# Patient Record
Sex: FEMALE | Race: WHITE | NOT HISPANIC OR LATINO | Employment: OTHER | URBAN - METROPOLITAN AREA
[De-identification: names, ages, dates, MRNs, and addresses within clinical notes are randomized per-mention and may not be internally consistent; named-entity substitution may affect disease eponyms.]

---

## 2017-02-02 ENCOUNTER — HOSPITAL ENCOUNTER (OUTPATIENT)
Facility: CLINIC | Age: 52
Setting detail: SPECIMEN
Discharge: HOME | End: 2017-02-02
Attending: INTERNAL MEDICINE | Admitting: INTERNAL MEDICINE
Payer: COMMERCIAL

## 2017-02-02 ENCOUNTER — ONCOLOGY VISIT (OUTPATIENT)
Dept: ONCOLOGY | Facility: CLINIC | Age: 52
End: 2017-02-02
Attending: INTERNAL MEDICINE
Payer: COMMERCIAL

## 2017-02-02 VITALS
OXYGEN SATURATION: 100 % | DIASTOLIC BLOOD PRESSURE: 75 MMHG | WEIGHT: 237.2 LBS | HEIGHT: 65 IN | TEMPERATURE: 98.9 F | SYSTOLIC BLOOD PRESSURE: 124 MMHG | HEART RATE: 68 BPM | RESPIRATION RATE: 16 BRPM | BODY MASS INDEX: 39.52 KG/M2

## 2017-02-02 DIAGNOSIS — D05.11 DCIS (DUCTAL CARCINOMA IN SITU) OF BREAST, RIGHT: Primary | ICD-10-CM

## 2017-02-02 LAB
ALBUMIN SERPL-MCNC: 3.8 G/DL (ref 3.4–5)
ALP SERPL-CCNC: 112 U/L (ref 40–150)
ALT SERPL W P-5'-P-CCNC: 32 U/L (ref 0–50)
ANION GAP SERPL CALCULATED.3IONS-SCNC: 5 MMOL/L (ref 3–14)
AST SERPL W P-5'-P-CCNC: 16 U/L (ref 0–45)
BASOPHILS # BLD AUTO: 0.1 10E9/L (ref 0–0.2)
BASOPHILS NFR BLD AUTO: 0.8 %
BILIRUB SERPL-MCNC: 0.5 MG/DL (ref 0.2–1.3)
BUN SERPL-MCNC: 19 MG/DL (ref 7–30)
CALCIUM SERPL-MCNC: 9 MG/DL (ref 8.5–10.1)
CANCER AG27-29 SERPL-ACNC: 14 U/ML (ref 0–39)
CHLORIDE SERPL-SCNC: 105 MMOL/L (ref 94–109)
CO2 SERPL-SCNC: 29 MMOL/L (ref 20–32)
CREAT SERPL-MCNC: 0.81 MG/DL (ref 0.52–1.04)
DIFFERENTIAL METHOD BLD: NORMAL
EOSINOPHIL # BLD AUTO: 0.1 10E9/L (ref 0–0.7)
EOSINOPHIL NFR BLD AUTO: 1 %
ERYTHROCYTE [DISTWIDTH] IN BLOOD BY AUTOMATED COUNT: 11.8 % (ref 10–15)
GFR SERPL CREATININE-BSD FRML MDRD: 74 ML/MIN/1.7M2
GLUCOSE SERPL-MCNC: 90 MG/DL (ref 70–99)
HCT VFR BLD AUTO: 42.9 % (ref 35–47)
HGB BLD-MCNC: 14.7 G/DL (ref 11.7–15.7)
IMM GRANULOCYTES # BLD: 0.1 10E9/L (ref 0–0.4)
IMM GRANULOCYTES NFR BLD: 0.6 %
LYMPHOCYTES # BLD AUTO: 3.5 10E9/L (ref 0.8–5.3)
LYMPHOCYTES NFR BLD AUTO: 32.6 %
MCH RBC QN AUTO: 31.5 PG (ref 26.5–33)
MCHC RBC AUTO-ENTMCNC: 34.3 G/DL (ref 31.5–36.5)
MCV RBC AUTO: 92 FL (ref 78–100)
MONOCYTES # BLD AUTO: 0.8 10E9/L (ref 0–1.3)
MONOCYTES NFR BLD AUTO: 7.3 %
NEUTROPHILS # BLD AUTO: 6.2 10E9/L (ref 1.6–8.3)
NEUTROPHILS NFR BLD AUTO: 57.7 %
NRBC # BLD AUTO: 0 10*3/UL
NRBC BLD AUTO-RTO: 0 /100
PLATELET # BLD AUTO: 410 10E9/L (ref 150–450)
POTASSIUM SERPL-SCNC: 4.3 MMOL/L (ref 3.4–5.3)
PROT SERPL-MCNC: 8 G/DL (ref 6.8–8.8)
RBC # BLD AUTO: 4.66 10E12/L (ref 3.8–5.2)
SODIUM SERPL-SCNC: 139 MMOL/L (ref 133–144)
WBC # BLD AUTO: 10.7 10E9/L (ref 4–11)

## 2017-02-02 PROCEDURE — 80053 COMPREHEN METABOLIC PANEL: CPT | Performed by: INTERNAL MEDICINE

## 2017-02-02 PROCEDURE — 36415 COLL VENOUS BLD VENIPUNCTURE: CPT

## 2017-02-02 PROCEDURE — 99211 OFF/OP EST MAY X REQ PHY/QHP: CPT

## 2017-02-02 PROCEDURE — 85025 COMPLETE CBC W/AUTO DIFF WBC: CPT | Performed by: INTERNAL MEDICINE

## 2017-02-02 PROCEDURE — 86300 IMMUNOASSAY TUMOR CA 15-3: CPT | Performed by: INTERNAL MEDICINE

## 2017-02-02 PROCEDURE — 99214 OFFICE O/P EST MOD 30 MIN: CPT | Performed by: INTERNAL MEDICINE

## 2017-02-02 ASSESSMENT — PAIN SCALES - GENERAL: PAINLEVEL: NO PAIN (0)

## 2017-02-02 NOTE — PATIENT INSTRUCTIONS
-    labs today - drawn ljt    -return to clinic in 6 months with labs a few days prior - please schedule at [facility]-Scheduled for August 4th @ 8am  / [phone]/ Amy    AVS printed and given.  Amy

## 2017-02-02 NOTE — Clinical Note
"February 2, 2017      RE: Kimberly Miller  [address] [place] [address]      Kimberly Miller is a 51 year old female who presents for:  Chief Complaint   Patient presents with     Oncology Clinic Visit     DCIS (ductal carcinoma in situ) of breast, right-Follow-up        Initial Vitals:  Pulse 68  Temp(Src) 98.9  F (37.2  C) (Tympanic)  Resp 16  Ht 1.66 m (5' 5.35\")  Wt 107.593 kg (237 lb 3.2 oz)  BMI 39.05 kg/m2  SpO2 100%  LMP 04/14/2008 Estimated body mass index is 39.05 kg/(m^2) as calculated from the following:    Height as of this encounter: 1.66 m (5' 5.35\").    Weight as of this encounter: 107.593 kg (237 lb 3.2 oz).. Body surface area is 2.23 meters squared. BP completed using cuff size: large  No Pain (0) Patient's last menstrual period was 04/14/2008. Allergies and medications reviewed.     Medications: Medication refills not needed today.  Pharmacy name entered into [employer]:    [id number] [place] [facility] [address] [facility] [address]    Comments: Follow-up  8 minutes for nursing intake (face to face time)   [address]  DISCHARGE PLAN:  Next appointments: See patient instruction section  Departure Mode: Ambulatory  Accompanied by: self  0 minutes for nursing discharge (face to face time)   [address]              [facility]    Hematology/Oncology Established Patient Note      Today's Date: 02/07/2017    Reason for Follow-up: BRCA 2 mutation and DCIS      HISTORY OF PRESENT ILLNESS: Kimberly Milelr is a 51 year old female with history of HTN, who presents with DCIS.  She has significant family history of breast and ovarian cancer.  She is BRCA2 positive, and has been followed closely previously by Dr. Reed for high-risk.  On a surveillance MRI breast on 12/8/15, there was abnormal finding in the right breast, and ultimately was found to have DCIS.  On 1/28/16, she underwent " bilateral mastectomy by Dr. Alexander at [facility], with pathology showing DCIS of the right breast, grade 2 with occasional nuclear grade 3 cells, size 1.0 x 0.5 cm, negative for invasive carcinoma, 0/1 lymph node with carcinoma, ER positive at 75%, and AR positive at 20%.  She has undergone reconstruction.      She has history of SUMAYA-BSO in April 2008.  She notes that she still gets hot flashes occasionally.        INTERIM HISTORY: Kimberly comes in for follow-up today.  She was last seen about a year ago.  She says that over the past year, she has had 4 procedures for her reconstruction surgery, and she could not come to the appointments here.  She says that things are settling down now.        REVIEW OF SYSTEMS:   14 point ROS was reviewed and is negative other than as noted above in HPI.       HOME MEDICATIONS:  Current Outpatient Prescriptions   Medication Sig Dispense Refill     Probiotic Product (PROBIOTIC DAILY PO)        LISINOPRIL PO Take 10 mg by mouth daily           ALLERGIES:  Allergies   Allergen Reactions     Penicillin [Esters] Rash         PAST MEDICAL HISTORY:  Past Medical History   Diagnosis Date     Family history of ovarian cancer      Family history of breast cancer      Hypertension          PAST SURGICAL HISTORY:  Past Surgical History   Procedure Laterality Date     C removal gallbladder  1993     Colonoscopy  10/1/2012     Procedure: COLONOSCOPY;  Colonoscopy;  Surgeon: Karma Oates MD;  Location:  GI     Hc biopsy breast, perc needle core, no imaging Right 1/9/2008      - Benign     Biopsy breast Right 12/18/2015     MRI Biopsy         SOCIAL HISTORY:  Social History     Social History     Marital Status:      Spouse Name: N/A     Number of Children: N/A     Years of Education: N/A     Occupational History     Not on file.     Social History Main Topics     Smoking status: Never Smoker      Smokeless tobacco: Never Used     Alcohol Use: Yes      Comment: rarely      "Drug Use: No     Sexual Activity:     Partners: Male     Other Topics Concern     Parent/Sibling W/ Cabg, Mi Or Angioplasty Before 65f 55m? No     Social History Narrative         FAMILY HISTORY:  Family History   Problem Relation Age of Onset     C.A.D. Mother      long QT syndrome     Genetic Disorder Father      + BRCA 2; no cancer     CANCER Father 81     CMML     CANCER Sister      breast- + BRCA 2     CANCER Brother      lung diagnosed age 36     CANCER Maternal Grandmother      ovarian     CANCER Maternal Grandfather      lung     CANCER Other      distant relative with breast cancer; male     CANCER Other      distant relative with breast cancer; female     CANCER Other      distant relative with breast cancer; female         PHYSICAL EXAM:  Vital signs:  /75 mmHg  Pulse 68  Temp(Src) 98.9  F (37.2  C) (Tympanic)  Resp 16  Ht 1.66 m (5' 5.35\")  Wt 107.593 kg (237 lb 3.2 oz)  BMI 39.05 kg/m2  SpO2 100%  LMP 2008   ECO  GENERAL/CONSTITUTIONAL: No acute distress.  EYES: No scleral icterus.  LYMPH: No anterior cervical, posterior cervical, supraclavicular, or axillary adenopathy.   RESPIRATORY: Clear to auscultation bilaterally. No crackles or wheezing.   CARDIOVASCULAR: Regular rate and rhythm without murmurs, gallops, or rubs.  GASTROINTESTINAL: No tenderness. The patient has normal bowel sounds. No guarding.  No distention.  BREAST: s/p bilateral mastectomies with reconstruction.  MUSCULOSKELETAL: Warm and well-perfused, no cyanosis, clubbing, or edema.  NEUROLOGIC: Alert, oriented, answers questions appropriately.  INTEGUMENTARY: No rashes or jaundice.  GAIT: Steady, does not use assistive device      LABS:  CBC RESULTS:   Recent Labs   Lab Test  17   1630   WBC  10.7   RBC  4.66   HGB  14.7   HCT  42.9   MCV  92   MCH  31.5   MCHC  34.3   RDW  11.8   PLT  410     CMP and CA 27-29 are pending.        ASSESSMENT/PLAN:  Kimberly Miller is a 51 year old post-menopausal female " with:    1) DCIS of the right breast: ER positive, [place] positive.  +BRCA2 mutation.  She is s/p bilateral mastectomy with reconstruction.  She has also had SUMAYA-BSO in 2008.  We previously discussed chemoprevention with tamoxifen or AI, weighing the benefits and risks/toxicities.  Ultimately, as she has had a risk-reducing bilateral mastectomy, she decided not to start hormonal therapy, and opting for surveillance with labs and tumor markers.  Will obtain imaging only if symptoms arise.    She has had no evidence of recurrence on exam.  -labs today  -RTC in 6 months with labs: CBC, CMP, CA 27-29      I spent a total of 25 minutes with the patient, with over >50% of the time in counseling and/or coordination of care.      Becky Matta MD  Hematology/Oncology  [facility]        Becky Matta MD

## 2017-02-02 NOTE — MR AVS SNAPSHOT
After Visit Summary   2/2/2017    Kimberly Miller    MRN: [id number]           Patient Information     Date Of Birth          1965        Visit Information        Provider Department      2/2/2017 3:45 PM [facility]        Today's Diagnoses     DCIS (ductal carcinoma in situ) of breast, right    -  1       Care Instructions    -    labs today - drawn ljt    -return to clinic in 6 months with labs a few days prior - please schedule at [facility]        Follow-ups after your visit        Your next 10 appointments already scheduled     Aug 04, 2017  8:00 AM   Return Visit with Becky Matta MD   [facility] ([facility])    [facility]  [address]  [facility] [id number] [phone]              Future tests that were ordered for you today     Open Future Orders        Priority Expected Expires Ordered    CBC with platelets differential Routine 8/2/2017 2/2/2018 2/2/2017    Comprehensive metabolic panel Routine 8/2/2017 2/2/2018 2/2/2017    Ca27.29  breast tumor marker Routine 8/2/2017 2/2/2018 2/2/2017            Who to contact     If you have questions or need follow up information about today's clinic visit or your schedule please contact [facility] directly at [phone].  Normal or non-critical lab and imaging results will be communicated to you by MyChart, letter or phone within 4 business days after the clinic has received the results. If you do not hear from us within 7 days, please contact the clinic through MyChart or phone. If you have a critical or abnormal lab result, we will notify you by phone as soon as possible.  Submit refill requests through [employer] or call your pharmacy and they will forward the refill request to us. Please allow 3 business days for your refill to be completed.          Additional Information About Your  "Visit        Fixetudehart Information     [employer] lets you send messages to your doctor, view your test results, renew your prescriptions, schedule appointments and more. To sign up, go to [email or web address] . Click on \"Log in\" on the left side of the screen, which will take you to the Welcome page. Then click on \"Sign up Now\" on the right side of the page.     You will be asked to enter the access code listed below, as well as some personal information. Please follow the directions to create your username and password.     Your access code is: [email or web address]  Expires: 5/3/2017  4:38 PM     Your access code will  in 90 days. If you need help or a new code, please call your [place] clinic or [phone].        Care EveryWhere ID     This is your Care EveryWhere ID. This could be used by other organizations to access your [place] medical records  [id number]        Your Vitals Were     Pulse Temperature Respirations    68 98.9  F (37.2  C) (Tympanic) 16    Height BMI (Body Mass Index) Pulse Oximetry    1.66 m (5' 5.35\") 39.05 kg/m2 100%    Last Period          2008         Blood Pressure from Last 3 Encounters:   17 124/75   16 124/80   13 156/94    Weight from Last 3 Encounters:   17 107.593 kg (237 lb 3.2 oz)   16 102.694 kg (226 lb 6.4 oz)   12/18/15 104.327 kg (230 lb)              We Performed the Following     Ca27.29  breast tumor marker     CBC with platelets and differential     Comprehensive metabolic panel (BMP + Alb, Alk Phos, ALT, AST, Total. Bili, TP)        Primary Care Provider Office Phone # Fax #    Chase Coyne [phone]       [employer]  [phone]  [facility] [place] [address]        Thank you!     Thank you for choosing [facility]  for your care. Our goal is always to provide you with excellent care. Hearing back from our patients is one way we can continue to improve our services. Please take a few minutes " to complete the written survey that you may receive in the mail after your visit with us. Thank you!             Your Updated Medication List - Protect others around you: Learn how to safely use, store and throw away your medicines at www.disposemymeds.org.          This list is accurate as of: 2/2/17  4:38 PM.  Always use your most recent med list.                   Brand Name Dispense Instructions for use    LISINOPRIL PO      Take 10 mg by mouth daily       PROBIOTIC DAILY PO

## 2017-02-02 NOTE — PROGRESS NOTES
"Kimberly Miller is a 51 year old female who presents for:  Chief Complaint   Patient presents with     Oncology Clinic Visit     DCIS (ductal carcinoma in situ) of breast, right-Follow-up        Initial Vitals:  Pulse 68  Temp(Src) 98.9  F (37.2  C) (Tympanic)  Resp 16  Ht 1.66 m (5' 5.35\")  Wt 107.593 kg (237 lb 3.2 oz)  BMI 39.05 kg/m2  SpO2 100%  LMP 04/14/2008 Estimated body mass index is 39.05 kg/(m^2) as calculated from the following:    Height as of this encounter: 1.66 m (5' 5.35\").    Weight as of this encounter: 107.593 kg (237 lb 3.2 oz).. Body surface area is 2.23 meters squared. BP completed using cuff size: large  No Pain (0) Patient's last menstrual period was 04/14/2008. Allergies and medications reviewed.     Medications: Medication refills not needed today.  Pharmacy name entered into [employer]:    [facility] [place], [place] [facility] [address] [facility] [place], [place] - [address]    Comments: Follow-up  8 minutes for nursing intake (face to face time)   [address]  DISCHARGE PLAN:  Next appointments: See patient instruction section  Departure Mode: Ambulatory  Accompanied by: self  0 minutes for nursing discharge (face to face time)   [address]            "

## 2017-02-02 NOTE — PROGRESS NOTES
[facility]    Hematology/Oncology Established Patient Note      Today's Date: 02/07/2017    Reason for Follow-up: BRCA 2 mutation and DCIS      HISTORY OF PRESENT ILLNESS: Kimberly Miller is a 51 year old female with history of HTN, who presents with DCIS.  She has significant family history of breast and ovarian cancer.  She is BRCA2 positive, and has been followed closely previously by Dr. Reed for high-risk.  On a surveillance MRI breast on 12/8/15, there was abnormal finding in the right breast, and ultimately was found to have DCIS.  On 1/28/16, she underwent bilateral mastectomy by Dr. Alexander at [facility], with pathology showing DCIS of the right breast, grade 2 with occasional nuclear grade 3 cells, size 1.0 x 0.5 cm, negative for invasive carcinoma, 0/1 lymph node with carcinoma, ER positive at 75%, and [place] positive at 20%.  She has undergone reconstruction.      She has history of SUMAYA-BSO in April 2008.  She notes that she still gets hot flashes occasionally.        INTERIM HISTORY: Kimberly comes in for follow-up today.  She was last seen about a year ago.  She says that over the past year, she has had 4 procedures for her reconstruction surgery, and she could not come to the appointments here.  She says that things are settling down now.        REVIEW OF SYSTEMS:   14 point ROS was reviewed and is negative other than as noted above in HPI.       HOME MEDICATIONS:  Current Outpatient Prescriptions   Medication Sig Dispense Refill     Probiotic Product (PROBIOTIC DAILY PO)        LISINOPRIL PO Take 10 mg by mouth daily           ALLERGIES:  Allergies   Allergen Reactions     Penicillin [Esters] Rash         PAST MEDICAL HISTORY:  Past Medical History   Diagnosis Date     Family history of ovarian cancer      Family history of breast cancer      Hypertension          PAST SURGICAL HISTORY:  Past Surgical History   Procedure Laterality Date     C removal gallbladder  1993  "    Colonoscopy  10/1/2012     Procedure: COLONOSCOPY;  Colonoscopy;  Surgeon: Karma Oates MD;  Location: [facility]     Hc biopsy breast, perc needle core, no imaging Right 2008      - Benign     Biopsy breast Right 2015     MRI Biopsy         SOCIAL HISTORY:  Social History     Social History     Marital Status:      Spouse Name: N/A     Number of Children: N/A     Years of Education: N/A     Occupational History     Not on file.     Social History Main Topics     Smoking status: Never Smoker      Smokeless tobacco: Never Used     Alcohol Use: Yes      Comment: rarely     Drug Use: No     Sexual Activity:     Partners: Male     Other Topics Concern     Parent/Sibling W/ Cabg, Mi Or Angioplasty Before 65f 55m? No     Social History Narrative         FAMILY HISTORY:  Family History   Problem Relation Age of Onset     C.A.D. Mother      long QT syndrome     Genetic Disorder Father      + BRCA 2; no cancer     CANCER Father 81     CMML     CANCER Sister      breast- + BRCA 2     CANCER Brother      lung diagnosed age 36     CANCER Maternal Grandmother      ovarian     CANCER Maternal Grandfather      lung     CANCER Other      distant relative with breast cancer; male     CANCER Other      distant relative with breast cancer; female     CANCER Other      distant relative with breast cancer; female         PHYSICAL EXAM:  Vital signs:  /75 mmHg  Pulse 68  Temp(Src) 98.9  F (37.2  C) (Tympanic)  Resp 16  Ht 1.66 m (5' 5.35\")  Wt 107.593 kg (237 lb 3.2 oz)  BMI 39.05 kg/m2  SpO2 100%  LMP 2008   ECO  GENERAL/CONSTITUTIONAL: No acute distress.  EYES: No scleral icterus.  LYMPH: No anterior cervical, posterior cervical, supraclavicular, or axillary adenopathy.   RESPIRATORY: Clear to auscultation bilaterally. No crackles or wheezing.   CARDIOVASCULAR: Regular rate and rhythm without murmurs, gallops, or rubs.  GASTROINTESTINAL: No tenderness. The patient has normal bowel sounds. " No guarding.  No distention.  BREAST: s/p bilateral mastectomies with reconstruction.  MUSCULOSKELETAL: Warm and well-perfused, no cyanosis, clubbing, or edema.  NEUROLOGIC: Alert, oriented, answers questions appropriately.  INTEGUMENTARY: No rashes or jaundice.  GAIT: Steady, does not use assistive device      LABS:  CBC RESULTS:   Recent Labs   Lab Test  02/02/17   1630   WBC  10.7   RBC  4.66   HGB  14.7   HCT  42.9   MCV  92   MCH  31.5   MCHC  34.3   RDW  11.8   PLT  410     CMP and CA 27-29 are pending.        ASSESSMENT/PLAN:  Kimberly Miller is a 51 year old post-menopausal female with:    1) DCIS of the right breast: ER positive, [place] positive.  +BRCA2 mutation.  She is s/p bilateral mastectomy with reconstruction.  She has also had SUMAAY-BSO in 2008.  We previously discussed chemoprevention with tamoxifen or AI, weighing the benefits and risks/toxicities.  Ultimately, as she has had a risk-reducing bilateral mastectomy, she decided not to start hormonal therapy, and opting for surveillance with labs and tumor markers.  Will obtain imaging only if symptoms arise.    She has had no evidence of recurrence on exam.  -labs today  -RTC in 6 months with labs: CBC, CMP, CA 27-29      I spent a total of 25 minutes with the patient, with over >50% of the time in counseling and/or coordination of care.      Becky Matta MD  Hematology/Oncology  [facility]

## 2017-07-31 ENCOUNTER — ONCOLOGY VISIT (OUTPATIENT)
Dept: ONCOLOGY | Facility: CLINIC | Age: 52
End: 2017-07-31
Attending: INTERNAL MEDICINE
Payer: COMMERCIAL

## 2017-07-31 ENCOUNTER — HOSPITAL ENCOUNTER (OUTPATIENT)
Facility: CLINIC | Age: 52
Setting detail: SPECIMEN
Discharge: HOME | End: 2017-07-31
Attending: INTERNAL MEDICINE | Admitting: INTERNAL MEDICINE
Payer: COMMERCIAL

## 2017-07-31 DIAGNOSIS — D05.11 DUCTAL CARCINOMA IN SITU (DCIS) OF RIGHT BREAST: ICD-10-CM

## 2017-07-31 LAB
ALBUMIN SERPL-MCNC: 3.7 G/DL (ref 3.4–5)
ALP SERPL-CCNC: 102 U/L (ref 40–150)
ALT SERPL W P-5'-P-CCNC: 28 U/L (ref 0–50)
ANION GAP SERPL CALCULATED.3IONS-SCNC: 8 MMOL/L (ref 3–14)
AST SERPL W P-5'-P-CCNC: 13 U/L (ref 0–45)
BASOPHILS # BLD AUTO: 0.1 10E9/L (ref 0–0.2)
BASOPHILS NFR BLD AUTO: 0.7 %
BILIRUB SERPL-MCNC: 0.9 MG/DL (ref 0.2–1.3)
BUN SERPL-MCNC: 14 MG/DL (ref 7–30)
CALCIUM SERPL-MCNC: 8.8 MG/DL (ref 8.5–10.1)
CANCER AG27-29 SERPL-ACNC: 9 U/ML (ref 0–39)
CHLORIDE SERPL-SCNC: 107 MMOL/L (ref 94–109)
CO2 SERPL-SCNC: 25 MMOL/L (ref 20–32)
CREAT SERPL-MCNC: 0.69 MG/DL (ref 0.52–1.04)
DIFFERENTIAL METHOD BLD: NORMAL
EOSINOPHIL # BLD AUTO: 0.1 10E9/L (ref 0–0.7)
EOSINOPHIL NFR BLD AUTO: 1.2 %
ERYTHROCYTE [DISTWIDTH] IN BLOOD BY AUTOMATED COUNT: 12.5 % (ref 10–15)
GFR SERPL CREATININE-BSD FRML MDRD: 90 ML/MIN/1.7M2
GLUCOSE SERPL-MCNC: 79 MG/DL (ref 70–99)
HCT VFR BLD AUTO: 42.1 % (ref 35–47)
HGB BLD-MCNC: 14.7 G/DL (ref 11.7–15.7)
IMM GRANULOCYTES # BLD: 0 10E9/L (ref 0–0.4)
IMM GRANULOCYTES NFR BLD: 0.3 %
LYMPHOCYTES # BLD AUTO: 2.5 10E9/L (ref 0.8–5.3)
LYMPHOCYTES NFR BLD AUTO: 33.8 %
MCH RBC QN AUTO: 31.1 PG (ref 26.5–33)
MCHC RBC AUTO-ENTMCNC: 34.9 G/DL (ref 31.5–36.5)
MCV RBC AUTO: 89 FL (ref 78–100)
MONOCYTES # BLD AUTO: 0.7 10E9/L (ref 0–1.3)
MONOCYTES NFR BLD AUTO: 9.5 %
NEUTROPHILS # BLD AUTO: 4.1 10E9/L (ref 1.6–8.3)
NEUTROPHILS NFR BLD AUTO: 54.5 %
PLATELET # BLD AUTO: 318 10E9/L (ref 150–450)
POTASSIUM SERPL-SCNC: 4.1 MMOL/L (ref 3.4–5.3)
PROT SERPL-MCNC: 7.9 G/DL (ref 6.8–8.8)
RBC # BLD AUTO: 4.73 10E12/L (ref 3.8–5.2)
SODIUM SERPL-SCNC: 140 MMOL/L (ref 133–144)
WBC # BLD AUTO: 7.5 10E9/L (ref 4–11)

## 2017-07-31 PROCEDURE — 36415 COLL VENOUS BLD VENIPUNCTURE: CPT

## 2017-07-31 PROCEDURE — 86300 IMMUNOASSAY TUMOR CA 15-3: CPT | Performed by: INTERNAL MEDICINE

## 2017-07-31 PROCEDURE — 80053 COMPREHEN METABOLIC PANEL: CPT | Performed by: INTERNAL MEDICINE

## 2017-07-31 PROCEDURE — 85025 COMPLETE CBC W/AUTO DIFF WBC: CPT | Performed by: INTERNAL MEDICINE

## 2017-07-31 NOTE — MR AVS SNAPSHOT
"              After Visit Summary   7/31/2017    Kimberly Miller    MRN: [id number]           Patient Information     Date Of Birth          1965        Visit Information        Provider Department      7/31/2017 8:00 AM Nurse, [facility] [place]        Today's Diagnoses     Ductal carcinoma in situ (DCIS) of right breast           Follow-ups after your visit        Your next 10 appointments already scheduled     Aug 04, 2017  8:00 AM CDT   Return Visit with Becky Matta MD   [facility] [place] ([facility])    [facility]  [address] [facility] [address]  [facility] [id number] [phone]              Who to contact     If you have questions or need follow up information about today's clinic visit or your schedule please contact [facility] directly at [phone].  Normal or non-critical lab and imaging results will be communicated to you by MyChart, letter or phone within 4 business days after the clinic has received the results. If you do not hear from us within 7 days, please contact the clinic through MyChart or phone. If you have a critical or abnormal lab result, we will notify you by phone as soon as possible.  Submit refill requests through [employer] or call your pharmacy and they will forward the refill request to us. Please allow 3 business days for your refill to be completed.          Additional Information About Your Visit        MyChart Information     [employer] lets you send messages to your doctor, view your test results, renew your prescriptions, schedule appointments and more. To sign up, go to [email or web address] . Click on \"Log in\" on the left side of the screen, which will take you to the Welcome page. Then click on \"Sign up Now\" on the right side of the page.     You will be asked to enter the access code listed below, as well as some personal information. Please follow the directions to create your " username and password.     Your access code is: [email or web address]  Expires: 10/29/2017  1:20 PM     Your access code will  in 90 days. If you need help or a new code, please call your [facility] or [phone].        Care EveryWhere ID     This is your Care EveryWhere ID. This could be used by other organizations to access your [place] medical records  [id number]        Your Vitals Were     Last Period                   2008            Blood Pressure from Last 3 Encounters:   17 124/75   16 124/80   13 (!) 156/94    Weight from Last 3 Encounters:   17 107.6 kg (237 lb 3.2 oz)   16 102.7 kg (226 lb 6.4 oz)   12/18/15 104.3 kg (230 lb)              We Performed the Following     Ca27.29  breast tumor marker     Ca27.29  breast tumor marker     CBC with platelets differential     CBC with platelets differential     Comprehensive metabolic panel     Comprehensive metabolic panel        Primary Care Provider Office Phone # Fax #    Chase Deysi Coyne [phone]       [facility] [address]  [facility] [address]        Equal Access to Services     [place] AH: Hadjayashree ortega Soeusebio, waaxda lujuan jose, qaybta kaalmada lexis, heaven morton. So [facility] [phone].    ATENCIÓN: Si habla español, tiene a aguilar disposición servicios gratuitos de asistencia lingüística. [facility] [phone].    We comply with applicable federal civil rights laws and [place] laws. We do not discriminate on the basis of race, color, national origin, age, disability sex, sexual orientation or gender identity.            Thank you!     Thank you for choosing [facility]  for your care. Our goal is always to provide you with excellent care. Hearing back from our patients is one way we can continue to improve our services. Please take a few minutes to complete the written survey that you may receive in the mail after your visit with  us. Thank you!             Your Updated Medication List - Protect others around you: Learn how to safely use, store and throw away your medicines at www.disposemymeds.org.          This list is accurate as of: 7/31/17  1:20 PM.  Always use your most recent med list.                   Brand Name Dispense Instructions for use Diagnosis    LISINOPRIL PO      Take 10 mg by mouth daily        PROBIOTIC DAILY PO       DCIS (ductal carcinoma in situ) of breast, right

## 2017-07-31 NOTE — PROGRESS NOTES
Patient here for labs  Labs drawn:   CBC/diff/PLT, AMP, CA 27.29  Peripheral L hand gauge, 21 needle type   Concerns No concerns at this time. Labs drawn without incident, pressure applied with 2x2 and secured with bandaid and discharge to lobby in stable condition.  Denies concerns or issues that need to be addressed prior to appt with MD Laila Conway

## 2017-08-04 ENCOUNTER — ONCOLOGY VISIT (OUTPATIENT)
Dept: ONCOLOGY | Facility: CLINIC | Age: 52
End: 2017-08-04
Attending: INTERNAL MEDICINE
Payer: COMMERCIAL

## 2017-08-04 VITALS
BODY MASS INDEX: 38.42 KG/M2 | TEMPERATURE: 97.6 F | SYSTOLIC BLOOD PRESSURE: 118 MMHG | OXYGEN SATURATION: 99 % | WEIGHT: 233.4 LBS | DIASTOLIC BLOOD PRESSURE: 77 MMHG | RESPIRATION RATE: 16 BRPM | HEART RATE: 61 BPM

## 2017-08-04 DIAGNOSIS — D05.11 DUCTAL CARCINOMA IN SITU (DCIS) OF RIGHT BREAST: Primary | ICD-10-CM

## 2017-08-04 PROCEDURE — 99211 OFF/OP EST MAY X REQ PHY/QHP: CPT

## 2017-08-04 PROCEDURE — 99214 OFFICE O/P EST MOD 30 MIN: CPT | Performed by: INTERNAL MEDICINE

## 2017-08-04 ASSESSMENT — PAIN SCALES - GENERAL: PAINLEVEL: NO PAIN (1)

## 2017-08-04 NOTE — PROGRESS NOTES
[facility]    Hematology/Oncology Established Patient Note      Today's Date: 08/04/2017    Reason for Follow-up: BRCA 2 mutation and DCIS      HISTORY OF PRESENT ILLNESS: Kimberly Miller is a 52 year old female with history of HTN, who presents with DCIS.  She has significant family history of breast and ovarian cancer.  She is BRCA2 positive, and has been followed closely previously by Dr. Reed for high-risk.  On a surveillance MRI breast on 12/8/15, there was abnormal finding in the right breast, and ultimately was found to have DCIS.  On 1/28/16, she underwent bilateral mastectomy by Dr. Alexander at [facility], with pathology showing DCIS of the right breast, grade 2 with occasional nuclear grade 3 cells, size 1.0 x 0.5 cm, negative for invasive carcinoma, 0/1 lymph node with carcinoma, ER positive at 75%, and GA positive at 20%.  She has undergone reconstruction.      She has history of SUMAYA-BSO in April 2008.  She notes that she still gets hot flashes occasionally.        INTERIM HISTORY: Kimberly comes in for follow-up today.  She notes that she has had some pain and pulling sensation in the right arm, with mild swelling and tingling down the arm.  She feels like it is muscular and perhaps exacerbated by her typing and mild lymphedema from her past surgery.  She feels no new lumps/bumps.          REVIEW OF SYSTEMS:   14 point ROS was reviewed and is negative other than as noted above in HPI.       HOME MEDICATIONS:  Current Outpatient Prescriptions   Medication Sig Dispense Refill     Probiotic Product (PROBIOTIC DAILY PO)        LISINOPRIL PO Take 10 mg by mouth daily           ALLERGIES:  Allergies   Allergen Reactions     Penicillin [Esters] Rash         PAST MEDICAL HISTORY:  Past Medical History:   Diagnosis Date     Family history of breast cancer      Family history of ovarian cancer      Hypertension          PAST SURGICAL HISTORY:  Past Surgical History:   Procedure  Laterality Date     BIOPSY BREAST Right 2015    MRI Biopsy     COLONOSCOPY  10/1/2012    Procedure: COLONOSCOPY;  Colonoscopy;  Surgeon: Karma Oates MD;  Location: [facility]     HC BIOPSY BREAST, PERC NEEDLE CORE, NO IMAGING Right 2008     - Benign     HC REMOVAL GALLBLADDER  1993         SOCIAL HISTORY:  Social History     Social History     Marital status:      Spouse name: N/A     Number of children: N/A     Years of education: N/A     Occupational History     Not on file.     Social History Main Topics     Smoking status: Never Smoker     Smokeless tobacco: Never Used     Alcohol use Yes      Comment: rarely     Drug use: No     Sexual activity: Yes     Partners: Male     Other Topics Concern     Parent/Sibling W/ Cabg, Mi Or Angioplasty Before 65f 55m? No     Social History Narrative         FAMILY HISTORY:  Family History   Problem Relation Age of Onset     C.A.D. Mother      long QT syndrome     Genetic Disorder Father      + BRCA 2; no cancer     CANCER Father 81     CMML     CANCER Sister      breast- + BRCA 2     CANCER Brother      lung diagnosed age 36     CANCER Maternal Grandmother      ovarian     CANCER Maternal Grandfather      lung     CANCER Other      distant relative with breast cancer; male     CANCER Other      distant relative with breast cancer; female     CANCER Other      distant relative with breast cancer; female         PHYSICAL EXAM:  Vital signs:  /77 (BP Location: Left arm, Patient Position: Sitting, Cuff Size: Adult Large)  Pulse 61  Temp 97.6  F (36.4  C) (Oral)  Resp 16  Wt 105.9 kg (233 lb 6.4 oz)  LMP 2008  SpO2 99%  BMI 38.42 kg/m2   ECO  GENERAL/CONSTITUTIONAL: No acute distress.  EYES: No scleral icterus.  LYMPH: No anterior cervical, posterior cervical, supraclavicular, or axillary adenopathy.   RESPIRATORY: Clear to auscultation bilaterally. No crackles or wheezing.   CARDIOVASCULAR: Regular rate and rhythm without murmurs,  gallops, or rubs.  GASTROINTESTINAL: No tenderness. The patient has normal bowel sounds. No guarding.  No distention.  BREAST: s/p bilateral mastectomies with reconstruction.  MUSCULOSKELETAL: Warm and well-perfused. Very slight edema of the right upper extremity.  NEUROLOGIC: Alert, oriented, answers questions appropriately.  INTEGUMENTARY: No rashes or jaundice.  GAIT: Steady, does not use assistive device      LABS:  CBC RESULTS:   Recent Labs   Lab Test  07/31/17   0910   WBC  7.5   RBC  4.73   HGB  14.7   HCT  42.1   MCV  89   MCH  31.1   MCHC  34.9   RDW  12.5   PLT  318     Recent Labs   Lab Test  07/31/17   0910  02/02/17   1630   NA  140  139   POTASSIUM  4.1  4.3   CHLORIDE  107  105   CO2  25  29   ANIONGAP  8  5   GLC  79  90   BUN  14  19   CR  0.69  0.81   MILY  8.8  9.0     Lab Results   Component Value Date    AST 13 07/31/2017     Lab Results   Component Value Date    ALT 28 07/31/2017     No results found for: BILICONJ   Lab Results   Component Value Date    BILITOTAL 0.9 07/31/2017     Lab Results   Component Value Date    ALBUMIN 3.7 07/31/2017     Lab Results   Component Value Date    PROTTOTAL 7.9 07/31/2017      Lab Results   Component Value Date    ALKPHOS 102 07/31/2017       Component      Latest Ref Rng & Units 12/7/2011 5/25/2012 11/20/2012 5/23/2013   CA 27-29      0 - 39 U/mL 13 12 15 16     Component      Latest Ref Rng & Units 11/25/2013 2/2/2017 7/31/2017   CA 27-29      0 - 39 U/mL 13 14 9       ASSESSMENT/PLAN:  Kimberly Miller is a 52 year old post-menopausal female with:    1) DCIS of the right breast: ER positive, NC positive.  +BRCA2 mutation.  She is s/p bilateral mastectomy with reconstruction.  She has also had SUMAYA-BSO in 2008.  We previously discussed chemoprevention with tamoxifen or AI, weighing the benefits and risks/toxicities.  Ultimately, as she has had a risk-reducing bilateral mastectomy, she decided not to start hormonal therapy, and opting for surveillance  with labs and tumor markers.  Will obtain imaging only if symptoms arise.    She has had no evidence of recurrence on exam.  -RTC in 6 months with labs: CBC, CMP, CA 27-29    2) Right upper extremity pain with slight edema: Likely from past surgery from scar tissue, and likely some lymphedema.  There was no palpable lymphadenopathy.  She is seeing a chiropractor and getting massage therapy, which have been helping.  I discussed lymphedema therapy referral, and she is agreeable.  -lymphedema therapy referral placed      I spent a total of 25 minutes with the patient, with over >50% of the time in counseling and/or coordination of care.      Becky Matta MD  Hematology/Oncology  [facility]

## 2017-08-04 NOTE — LETTER
"    8/4/2017        RE: Kimberly Miller  [address] [facility] [address]        Oncology Rooming Note    August 4, 2017 8:00 AM   Kimberly Miller is a 52 year old female who presents for:    Chief Complaint   Patient presents with     Oncology Clinic Visit     DCIS (ductal carcinoma in situ) of breast, right     Initial Vitals: /77 (BP Location: Left arm, Patient Position: Sitting, Cuff Size: Adult Large)  Pulse 61  Temp 97.6  F (36.4  C) (Oral)  Resp 16  Wt 105.9 kg (233 lb 6.4 oz)  LMP 04/14/2008  SpO2 99%  BMI 38.42 kg/m2 Estimated body mass index is 38.42 kg/(m^2) as calculated from the following:    Height as of 2/2/17: 1.66 m (5' 5.35\").    Weight as of this encounter: 105.9 kg (233 lb 6.4 oz). Body surface area is 2.21 meters squared.  No Pain (1) Comment: right axillary pain   Patient's last menstrual period was 04/14/2008.  Allergies reviewed: Yes  Medications reviewed: Yes    Medications: Medication refills not needed today.  Pharmacy name entered into [employer]:    [facility] [address] [place], [place] - [address] [facility] [place], [place] - [address]    Clinical concerns: None         5 minutes for nursing intake (face to face time)     Smiley Conley MA    DISCHARGE PLAN:  Next appointments: See patient instruction section.  Appointments scheduled by LAITH Reis AVS given to patient.   Departure Mode: Ambulatory  Accompanied by: self  5 minutes for nursing discharge (face to face time)   Smiley Conley MA      [facility]    Hematology/Oncology Established Patient Note      Today's Date: 08/04/2017    Reason for Follow-up: BRCA 2 mutation and DCIS      HISTORY OF PRESENT ILLNESS: Kimberly Miller is a 52 year old female with history of HTN, who presents with DCIS.  She has significant family history of breast and ovarian cancer.  She is BRCA2 positive, and has been followed closely previously by Dr. Reed " for high-risk.  On a surveillance MRI breast on 12/8/15, there was abnormal finding in the right breast, and ultimately was found to have DCIS.  On 1/28/16, she underwent bilateral mastectomy by Dr. Alexander at [facility], with pathology showing DCIS of the right breast, grade 2 with occasional nuclear grade 3 cells, size 1.0 x 0.5 cm, negative for invasive carcinoma, 0/1 lymph node with carcinoma, ER positive at 75%, and WV positive at 20%.  She has undergone reconstruction.      She has history of USMAYA-BSO in April 2008.  She notes that she still gets hot flashes occasionally.        INTERIM HISTORY: Kimberly comes in for follow-up today.  She notes that she has had some pain and pulling sensation in the right arm, with mild swelling and tingling down the arm.  She feels like it is muscular and perhaps exacerbated by her typing and mild lymphedema from her past surgery.  She feels no new lumps/bumps.          REVIEW OF SYSTEMS:   14 point ROS was reviewed and is negative other than as noted above in HPI.       HOME MEDICATIONS:  Current Outpatient Prescriptions   Medication Sig Dispense Refill     Probiotic Product (PROBIOTIC DAILY PO)        LISINOPRIL PO Take 10 mg by mouth daily           ALLERGIES:  Allergies   Allergen Reactions     Penicillin [Esters] Rash         PAST MEDICAL HISTORY:  Past Medical History:   Diagnosis Date     Family history of breast cancer      Family history of ovarian cancer      Hypertension          PAST SURGICAL HISTORY:  Past Surgical History:   Procedure Laterality Date     BIOPSY BREAST Right 12/18/2015    MRI Biopsy     COLONOSCOPY  10/1/2012    Procedure: COLONOSCOPY;  Colonoscopy;  Surgeon: Karma Oates MD;  Location:  GI     HC BIOPSY BREAST, PERC NEEDLE CORE, NO IMAGING Right 1/9/2008     - Benign     HC REMOVAL GALLBLADDER  1993         SOCIAL HISTORY:  Social History     Social History     Marital status:      Spouse name: N/A     Number of children: N/A      Years of education: N/A     Occupational History     Not on file.     Social History Main Topics     Smoking status: Never Smoker     Smokeless tobacco: Never Used     Alcohol use Yes      Comment: rarely     Drug use: No     Sexual activity: Yes     Partners: Male     Other Topics Concern     Parent/Sibling W/ Cabg, Mi Or Angioplasty Before 65f 55m? No     Social History Narrative         FAMILY HISTORY:  Family History   Problem Relation Age of Onset     C.A.D. Mother      long QT syndrome     Genetic Disorder Father      + BRCA 2; no cancer     CANCER Father 81     CMML     CANCER Sister      breast- + BRCA 2     CANCER Brother      lung diagnosed age 36     CANCER Maternal Grandmother      ovarian     CANCER Maternal Grandfather      lung     CANCER Other      distant relative with breast cancer; male     CANCER Other      distant relative with breast cancer; female     CANCER Other      distant relative with breast cancer; female         PHYSICAL EXAM:  Vital signs:  /77 (BP Location: Left arm, Patient Position: Sitting, Cuff Size: Adult Large)  Pulse 61  Temp 97.6  F (36.4  C) (Oral)  Resp 16  Wt 105.9 kg (233 lb 6.4 oz)  LMP 2008  SpO2 99%  BMI 38.42 kg/m2   ECO  GENERAL/CONSTITUTIONAL: No acute distress.  EYES: No scleral icterus.  LYMPH: No anterior cervical, posterior cervical, supraclavicular, or axillary adenopathy.   RESPIRATORY: Clear to auscultation bilaterally. No crackles or wheezing.   CARDIOVASCULAR: Regular rate and rhythm without murmurs, gallops, or rubs.  GASTROINTESTINAL: No tenderness. The patient has normal bowel sounds. No guarding.  No distention.  BREAST: s/p bilateral mastectomies with reconstruction.  MUSCULOSKELETAL: Warm and well-perfused. Very slight edema of the right upper extremity.  NEUROLOGIC: Alert, oriented, answers questions appropriately.  INTEGUMENTARY: No rashes or jaundice.  GAIT: Steady, does not use assistive device      LABS:  CBC RESULTS:    Recent Labs   Lab Test  07/31/17   0910   WBC  7.5   RBC  4.73   HGB  14.7   HCT  42.1   MCV  89   MCH  31.1   MCHC  34.9   RDW  12.5   PLT  318     Recent Labs   Lab Test  07/31/17   0910  02/02/17   1630   NA  140  139   POTASSIUM  4.1  4.3   CHLORIDE  107  105   CO2  25  29   ANIONGAP  8  5   GLC  79  90   BUN  14  19   CR  0.69  0.81   MILY  8.8  9.0     Lab Results   Component Value Date    AST 13 07/31/2017     Lab Results   Component Value Date    ALT 28 07/31/2017     No results found for: BILICONJ   Lab Results   Component Value Date    BILITOTAL 0.9 07/31/2017     Lab Results   Component Value Date    ALBUMIN 3.7 07/31/2017     Lab Results   Component Value Date    PROTTOTAL 7.9 07/31/2017      Lab Results   Component Value Date    ALKPHOS 102 07/31/2017       Component      Latest Ref Rng & Units 12/7/2011 5/25/2012 11/20/2012 5/23/2013   CA 27-29      0 - 39 U/mL 13 12 15 16     Component      Latest Ref Rng & Units 11/25/2013 2/2/2017 7/31/2017   CA 27-29      0 - 39 U/mL 13 14 9       ASSESSMENT/PLAN:  Kimberly Miller is a 52 year old post-menopausal female with:    1) DCIS of the right breast: ER positive, NC positive.  +BRCA2 mutation.  She is s/p bilateral mastectomy with reconstruction.  She has also had SUMAYA-BSO in 2008.  We previously discussed chemoprevention with tamoxifen or AI, weighing the benefits and risks/toxicities.  Ultimately, as she has had a risk-reducing bilateral mastectomy, she decided not to start hormonal therapy, and opting for surveillance with labs and tumor markers.  Will obtain imaging only if symptoms arise.    She has had no evidence of recurrence on exam.  -RTC in 6 months with labs: CBC, CMP, CA 27-29    2) Right upper extremity pain with slight edema: Likely from past surgery from scar tissue, and likely some lymphedema.  There was no palpable lymphadenopathy.  She is seeing a chiropractor and getting massage therapy, which have been helping.  I discussed  lymphedema therapy referral, and she is agreeable.  -lymphedema therapy referral placed      I spent a total of 25 minutes with the patient, with over >50% of the time in counseling and/or coordination of care.      Becky Matta MD  Hematology/Oncology  [facility]          Sincerely,        Becky Matta MD

## 2017-08-04 NOTE — PROGRESS NOTES
"Oncology Rooming Note    August 4, 2017 8:00 AM   Kimberly Miller is a 52 year old female who presents for:    Chief Complaint   Patient presents with     Oncology Clinic Visit     DCIS (ductal carcinoma in situ) of breast, right     Initial Vitals: /77 (BP Location: Left arm, Patient Position: Sitting, Cuff Size: Adult Large)  Pulse 61  Temp 97.6  F (36.4  C) (Oral)  Resp 16  Wt 105.9 kg (233 lb 6.4 oz)  LMP 04/14/2008  SpO2 99%  BMI 38.42 kg/m2 Estimated body mass index is 38.42 kg/(m^2) as calculated from the following:    Height as of 2/2/17: 1.66 m (5' 5.35\").    Weight as of this encounter: 105.9 kg (233 lb 6.4 oz). Body surface area is 2.21 meters squared.  No Pain (1) Comment: right axillary pain   Patient's last menstrual period was 04/14/2008.  Allergies reviewed: Yes  Medications reviewed: Yes    Medications: Medication refills not needed today.  Pharmacy name entered into [facility]:    [facility] [address] [place] [facility] [place] [address] [facility] [address]    Clinical concerns: None         5 minutes for nursing intake (face to face time)     Smiley Conley MA    DISCHARGE PLAN:  Next appointments: See patient instruction section.  Appointments scheduled by LAITH Reis AVS given to patient.   Departure Mode: Ambulatory  Accompanied by: self  5 minutes for nursing discharge (face to face time)   Smiley Conley MA    "

## 2017-08-04 NOTE — PATIENT INSTRUCTIONS
-lymphedema referral Rehab scheduling will call you [phone]  -return to clinic in 6 months with labs a few days prior      02/06/2018 8:15 am Lab draw at Dr. Matta's office    02/09/2018 8:00 am Follow up Dr. Matta

## 2017-08-04 NOTE — MR AVS SNAPSHOT
"              After Visit Summary   8/4/2017    Kimberly Miller    MRN: [id number]           Patient Information     Date Of Birth          1965        Visit Information        Provider Department      8/4/2017 8:00 AM Becky Matta MD [facility]        Today's Diagnoses     Ductal carcinoma in situ (DCIS) of right breast    -  1      Care Instructions    -lymphedema referral Rehab scheduling will call you [phone]  -return to clinic in 6 months with labs a few days prior      02/06/2018 8:15 am Lab draw at Dr. Matta's office    02/09/2018 8:00 am Follow up Dr. Matta          Follow-ups after your visit        Additional Services     LYMPHEDEMA THERAPY REFERRAL       *This therapy referral will be filtered to a centralized scheduling office at [facility] and the patient will receive a call to schedule an appointment at a [place] location most convenient for them. *   If you have not heard from the scheduling office within 2 business days, please call [phone] for all locations, with the exception of [place], please call [phone].     Treatment: PT or OT Evaluation & Treatment  Special Instructions:   PT/OT Treatment Diagnosis: Lymphedema    Please be aware that coverage of these services is subject to the terms and limitations of your health insurance plan.  Call member services at your health plan with any benefit or coverage questions.      **Note to Provider:  If you are referring outside of [place] for the therapy appointment, please list the name of the location in the \"special instructions\" above, print the referral and give to the patient to schedule the appointment.                  Your next 10 appointments already scheduled     Feb 06, 2018  8:15 AM CST   Return Visit with  Oncology Nurse   [facility] ([facility])    [facility]  [address]  [facility] [address] " "  [phone]            Feb 09, 2018  8:00 AM CST   Return Visit with Becky Matta MD   [facility] ([facility])    [facility]  [address]  [place] [address]   [phone]              Future tests that were ordered for you today     Open Future Orders        Priority Expected Expires Ordered    CBC with platelets differential Routine 2/4/2018 8/4/2018 8/4/2017    Comprehensive metabolic panel Routine 2/4/2018 8/4/2018 8/4/2017    Ca27.29  breast tumor marker Routine 2/4/2018 8/4/2018 8/4/2017            Who to contact     If you have questions or need follow up information about today's clinic visit or your schedule please contact [facility] directly at [phone].  Normal or non-critical lab and imaging results will be communicated to you by MyChart, letter or phone within 4 business days after the clinic has received the results. If you do not hear from us within 7 days, please contact the clinic through [employer] or phone. If you have a critical or abnormal lab result, we will notify you by phone as soon as possible.  Submit refill requests through [employer] or call your pharmacy and they will forward the refill request to us. Please allow 3 business days for your refill to be completed.          Additional Information About Your Visit        MyChart Information     [employer] lets you send messages to your doctor, view your test results, renew your prescriptions, schedule appointments and more. To sign up, go to [email or web address] . Click on \"Log in\" on the left side of the screen, which will take you to the Welcome page. Then click on \"Sign up Now\" on the right side of the page.     You will be asked to enter the access code listed below, as well as some personal information. Please follow the directions to create your username and password.     Your access code is: [email or web address]  Expires: 10/29/2017  1:20 PM     Your " access code will  in 90 days. If you need help or a new code, please call your [place] clinic or [phone].        Care EveryWhere ID     This is your Care EveryWhere ID. This could be used by other organizations to access your [place] medical records  [id number]        Your Vitals Were     Pulse Temperature Respirations Last Period Pulse Oximetry BMI (Body Mass Index)    61 97.6  F (36.4  C) (Oral) 16 2008 99% 38.42 kg/m2       Blood Pressure from Last 3 Encounters:   17 118/77   17 124/75   16 124/80    Weight from Last 3 Encounters:   17 105.9 kg (233 lb 6.4 oz)   17 107.6 kg (237 lb 3.2 oz)   16 102.7 kg (226 lb 6.4 oz)              We Performed the Following     LYMPHEDEMA THERAPY REFERRAL        Primary Care Provider Office Phone # Fax #    Chaseaidee Coyne [phone]       [facility] [address]  [facility] [address]        Equal Access to Services     [facility]: Hadii radha Cabrera, carlos mata, sea pires, heaven peraza . So [facility] [phone].    ATENCIÓN: Si habla español, tiene a aguilar disposición servicios gratuitos de asistencia lingüística. [facility] [phone].    We comply with applicable federal civil rights laws and [place] laws. We do not discriminate on the basis of race, color, national origin, age, disability sex, sexual orientation or gender identity.            Thank you!     Thank you for choosing [facility]  for your care. Our goal is always to provide you with excellent care. Hearing back from our patients is one way we can continue to improve our services. Please take a few minutes to complete the written survey that you may receive in the mail after your visit with us. Thank you!             Your Updated Medication List - Protect others around you: Learn how to safely use, store and throw away your medicines at www.disposemymeds.org.           This list is accurate as of: 8/4/17  8:21 AM.  Always use your most recent med list.                   Brand Name Dispense Instructions for use Diagnosis    LISINOPRIL PO      Take 10 mg by mouth daily        PROBIOTIC DAILY PO       DCIS (ductal carcinoma in situ) of breast, right

## 2017-08-17 ENCOUNTER — HOSPITAL ENCOUNTER (OUTPATIENT)
Dept: OCCUPATIONAL THERAPY | Facility: CLINIC | Age: 52
Setting detail: THERAPIES SERIES
End: 2017-08-17
Attending: INTERNAL MEDICINE
Payer: COMMERCIAL

## 2017-08-17 PROCEDURE — 40000445 ZZHC STATISTIC OT VISIT, LYMPHEDEMA

## 2017-08-17 PROCEDURE — 97165 OT EVAL LOW COMPLEX 30 MIN: CPT | Mod: GO

## 2017-08-17 PROCEDURE — 97140 MANUAL THERAPY 1/> REGIONS: CPT | Mod: GO

## 2017-08-18 NOTE — PROGRESS NOTES
08/17/17 1800   Rehab Discipline   Discipline OT   Type of Visit   Type of visit Initial Edema Evaluation   General Information   Start of care 08/17/17   Referring physician Dr Matta   Orders Evaluate and treat as indicated   Order date 08/04/17   Medical diagnosis lymphedema   Onset of illness / date of surgery 08/04/17   Edema onset 08/04/17   Affected body parts RUE   Edema etiology Cancer with lymph node dissection;Surgery   Location - Cancer with lymph node dissection RUE/axilla-approx 2-3 LN removed and none positive. Pt underwent B mastectomy Jan 2016 with 3 subsequent surgeries for reconstruction and reconstruction repair. No radiation or chemotherapy received.   Edema etiology comments See above. Pt reports no issues with lymphedema following her cancer care until recently last month or so notes rings not fitting and hand/wrist swelling. Today she reports this has started to remediate itself and has returned to her baseline of no lymphedema. Pt attributes reductions to active UB use when kayacking as she cannot relate any other events other than possible reduction in heat outside that may have aided reductions.   Pertinent history of current problem (PT: include personal factors and/or comorbidities that impact the POC; OT: include additional occupational profile info) Pt with PMH significant for Breast Cancr treated with B mastectomy, HTN, and history hysterectomy.   Surgical / medical history reviewed Yes   Prior level of functional mobility I   Prior treatment (none)   Community support Family / friend caregiver   Patient role / employment history Employed   Psychosocial concerns Impaired coping   Living environment [facility]   Fall Screening   Fall screen completed by OT   Per patient, fall 2 or more times in past year? No   Per patient, fall with injury in past year? No   Is patient a fall risk? No   System Outcome Measures   Lymphedema Life Impact Scale (score range 0-72). A higher score  indicates greater impairment. 2   Subjective Report   Patient report of symptoms notes increased finger girth and rings don't fit   Patient / Family Goals   Patient / family goals statement To receive education on treatment options for lymphedema and s/s to monitor for detection   Pain   Patient currently in pain No   Cognitive Status   Orientation Orientation to person, place and time   Level of consciousness Alert   Follows commands and answers questions 100% of the time   Personal safety and judgement Intact   Memory Intact   Edema Exam / Assessment   Skin condition Non-pitting;Intact   Skin condition comments no palpable or detectable lymphedema   Scar (mastectomy scars)   Radial pulse Symmetrical   Stemmer sign Negative   Girth Measurements   Girth Measurements Refer to separate girth measurement flowsheet   Range of Motion   ROM comments WNL   Strength   Strength comments NT'd   Activities of Daily Living   Activities of Daily Living I   Bed Mobility   Bed mobility I   Transfers   Transfers I   Gait / Locomotion   Gait / Locomotion I   Sensory   Sensory perception comments no neuropathy reported. Pt reports isolated time when R elbow felt painful and had some tingling in forearm and effecting 4rd and 4th digits with flexion and extension in R hand-no longer present.   Coordination   Coordination Gross motor coordination appropriate   Muscle Tone   Muscle tone No deficits were identified   Planned Edema Interventions   Planned edema interventions Exercises;Precautions to prevent infection / exacerbation;Education;Skin care / precautions   Clinical Impression   Criteria for skilled therapeutic intervention met Yes   Therapy diagnosis lymphedema   Influenced by the following impairments / conditions Stage 1   Assessment of Occupational Performance 1-3 Performance Deficits   Identified Performance Deficits UB clothing fit compromised when in flare   Clinical Decision Making (Complexity) Low complexity   Treatment  frequency (1x treat)   Treatment duration 8/17/17   Patient / family and/or staff in agreement with plan of care Yes   Risks and benefits of therapy have been explained Yes   Clinical impression comments Pt will benefit from educational session for knowledge and skill building for identification of s/s lymphedema, treatment options she can employ at home to start, and knowledge building of clinic provisions for lymphedema care as needed in future.   Goals   Edema Eval Goals 1   Goal 1   Goal identifier 1   Goal description Pt will report understanding s/s lymphedema, treatment options, skin care precautions, and conservative means to address mild RUE lymphedema flares at home.   Target date 08/17/17   Date met 08/17/17   Total Evaluation Time   Total evaluation time 27

## 2017-08-18 NOTE — PROGRESS NOTES
Outpatient Occupational Therapy Discharge Note     Patient: Kimberly Atkins  : 1965  Insurance:   Payor/Plan Subscriber Name Rel Member # Group #   MEDICA - MEDICA CHOICE DOUGLAS ATKINS  [id number]       [id number]       Beginning/End Dates of Reporting Period:  17 to 2017    Referring Provider: Dr Matta    Therapy Diagnosis: lymphedema    Client Self Report:       Objective Measurements:see flowsheet                                                          Outcome Measures (most recent score):  Lymphedema Life Impact Scale (score range 0-72). A higher score indicates greater impairment.: 2-pre LLIS; no post LLIS as 1x treat and discharge      Goals:   Goal Identifier 1   Goal Description Pt will report understanding s/s lymphedema, treatment options, skin care precautions, and conservative means to address mild RUE lymphedema flares at home.   Target Date 17   Date Met  17   Progress:     Goal Identifier     Goal Description     Target Date     Date Met      Progress:     Goal Identifier     Goal Description     Target Date     Date Met      Progress:     Goal Identifier     Goal Description     Target Date     Date Met      Progress:     Goal Identifier     Goal Description     Target Date     Date Met      Progress:     Goal Identifier     Goal Description     Target Date     Date Met      Progress:     Goal Identifier     Goal Description     Target Date     Date Met      Progress:     Goal Identifier     Goal Description     Target Date     Date Met      Progress:     Progress Toward Goals:   Progress this reporting period: Pt educated on treatment options for lymphedema, conservative means to address flares if they reoccur to bridge gap to time for return to clinic, infection prevention, and s/s to monitor for early detection lymphedema. Pt presents without functional limitations and no skilled needs beyond education for home care management.        Plan:  Discharge  from therapy.    Discharge:    Reason for Discharge: Patient has met all goals.  No further expectation of progress.    Equipment Issued:     Discharge Plan: Patient to continue home program.

## 2018-02-06 ENCOUNTER — ONCOLOGY VISIT (OUTPATIENT)
Dept: ONCOLOGY | Facility: CLINIC | Age: 53
End: 2018-02-06
Attending: INTERNAL MEDICINE
Payer: COMMERCIAL

## 2018-02-06 ENCOUNTER — HOSPITAL ENCOUNTER (OUTPATIENT)
Facility: CLINIC | Age: 53
Setting detail: SPECIMEN
Discharge: HOME | End: 2018-02-06
Attending: INTERNAL MEDICINE | Admitting: INTERNAL MEDICINE
Payer: COMMERCIAL

## 2018-02-06 DIAGNOSIS — D05.11 DUCTAL CARCINOMA IN SITU (DCIS) OF RIGHT BREAST: ICD-10-CM

## 2018-02-06 LAB
ALBUMIN SERPL-MCNC: 3.7 G/DL (ref 3.4–5)
ALP SERPL-CCNC: 96 U/L (ref 40–150)
ALT SERPL W P-5'-P-CCNC: 34 U/L (ref 0–50)
ANION GAP SERPL CALCULATED.3IONS-SCNC: 6 MMOL/L (ref 3–14)
AST SERPL W P-5'-P-CCNC: 14 U/L (ref 0–45)
BASOPHILS # BLD AUTO: 0 10E9/L (ref 0–0.2)
BASOPHILS NFR BLD AUTO: 0.5 %
BILIRUB SERPL-MCNC: 0.7 MG/DL (ref 0.2–1.3)
BUN SERPL-MCNC: 14 MG/DL (ref 7–30)
CALCIUM SERPL-MCNC: 8.7 MG/DL (ref 8.5–10.1)
CANCER AG27-29 SERPL-ACNC: 10 U/ML (ref 0–39)
CHLORIDE SERPL-SCNC: 105 MMOL/L (ref 94–109)
CO2 SERPL-SCNC: 29 MMOL/L (ref 20–32)
CREAT SERPL-MCNC: 0.79 MG/DL (ref 0.52–1.04)
DIFFERENTIAL METHOD BLD: NORMAL
EOSINOPHIL # BLD AUTO: 0.1 10E9/L (ref 0–0.7)
EOSINOPHIL NFR BLD AUTO: 1.1 %
ERYTHROCYTE [DISTWIDTH] IN BLOOD BY AUTOMATED COUNT: 12.2 % (ref 10–15)
GFR SERPL CREATININE-BSD FRML MDRD: 76 ML/MIN/1.7M2
GLUCOSE SERPL-MCNC: 93 MG/DL (ref 70–99)
HCT VFR BLD AUTO: 42.2 % (ref 35–47)
HGB BLD-MCNC: 14.9 G/DL (ref 11.7–15.7)
IMM GRANULOCYTES # BLD: 0 10E9/L (ref 0–0.4)
IMM GRANULOCYTES NFR BLD: 0.2 %
LYMPHOCYTES # BLD AUTO: 2.9 10E9/L (ref 0.8–5.3)
LYMPHOCYTES NFR BLD AUTO: 35 %
MCH RBC QN AUTO: 32 PG (ref 26.5–33)
MCHC RBC AUTO-ENTMCNC: 35.3 G/DL (ref 31.5–36.5)
MCV RBC AUTO: 91 FL (ref 78–100)
MONOCYTES # BLD AUTO: 0.4 10E9/L (ref 0–1.3)
MONOCYTES NFR BLD AUTO: 4.6 %
NEUTROPHILS # BLD AUTO: 4.8 10E9/L (ref 1.6–8.3)
NEUTROPHILS NFR BLD AUTO: 58.6 %
NRBC # BLD AUTO: 0 10*3/UL
NRBC BLD AUTO-RTO: 0 /100
PLATELET # BLD AUTO: 285 10E9/L (ref 150–450)
POTASSIUM SERPL-SCNC: 3.9 MMOL/L (ref 3.4–5.3)
PROT SERPL-MCNC: 7.6 G/DL (ref 6.8–8.8)
RBC # BLD AUTO: 4.65 10E12/L (ref 3.8–5.2)
SODIUM SERPL-SCNC: 140 MMOL/L (ref 133–144)
WBC # BLD AUTO: 8.2 10E9/L (ref 4–11)

## 2018-02-06 PROCEDURE — 86300 IMMUNOASSAY TUMOR CA 15-3: CPT | Performed by: INTERNAL MEDICINE

## 2018-02-06 PROCEDURE — 85025 COMPLETE CBC W/AUTO DIFF WBC: CPT | Performed by: INTERNAL MEDICINE

## 2018-02-06 PROCEDURE — 80053 COMPREHEN METABOLIC PANEL: CPT | Performed by: INTERNAL MEDICINE

## 2018-02-06 PROCEDURE — 36415 COLL VENOUS BLD VENIPUNCTURE: CPT

## 2018-02-06 NOTE — PROGRESS NOTES
Medical Assistant Note:  Kimberly Branchia Angela presents today for lab visit.    Patient seen by provider today: No.   present during visit today: Not Applicable.    Concerns: No Concerns.    Procedure:  Lab draw site: LAC, Needle type: BF, Gauge: 21 g gauze and coban applied.    Post Assessment:  Labs drawn without difficulty: Yes.    Discharge Plan:  Departure Mode: Ambulatory.    Face to Face Time: 4.    Smiley Conley MA

## 2018-02-06 NOTE — LETTER
2/6/2018         RE: Kimberly Miller  [address]  [place] [facility] [address]        Dear Colleague,    Thank you for referring your patient, Kimberly Miller, to the [facility]. Please see a copy of my visit note below.    Medical Assistant Note:  Kimberly Miller presents today for lab visit.    Patient seen by provider today: No.   present during visit today: Not Applicable.    Concerns: No Concerns.    Procedure:  Lab draw site: LAC, Needle type: BF, Gauge: 21 g gauze and coban applied.    Post Assessment:  Labs drawn without difficulty: Yes.    Discharge Plan:  Departure Mode: Ambulatory.    Face to Face Time: 4.    Smiley Conley MA              Again, thank you for allowing me to participate in the care of your patient.        Sincerely,        Oncology Nurse

## 2018-02-06 NOTE — MR AVS SNAPSHOT
"              After Visit Summary   2/6/2018    Kimberly Miller    MRN: [id number]           Patient Information     Date Of Birth          1965        Visit Information        Provider Department      2/6/2018 8:15 AM Nurse, [facility]        Today's Diagnoses     Ductal carcinoma in situ (DCIS) of right breast           Follow-ups after your visit        Your next 10 appointments already scheduled     Feb 09, 2018  8:00 AM CST   Return Visit with Becky Matta MD   [facility] ([facility])    [facility]  [address] [facility] [address]  [facility] [id number] [phone]              Who to contact     If you have questions or need follow up information about today's clinic visit or your schedule please contact [facility] directly at [phone].  Normal or non-critical lab and imaging results will be communicated to you by MyChart, letter or phone within 4 business days after the clinic has received the results. If you do not hear from us within 7 days, please contact the clinic through MyChart or phone. If you have a critical or abnormal lab result, we will notify you by phone as soon as possible.  Submit refill requests through [employer] or call your pharmacy and they will forward the refill request to us. Please allow 3 business days for your refill to be completed.          Additional Information About Your Visit        MyChart Information     Hitwise lets you send messages to your doctor, view your test results, renew your prescriptions, schedule appointments and more. To sign up, go to [email or web address] . Click on \"Log in\" on the left side of the screen, which will take you to the Welcome page. Then click on \"Sign up Now\" on the right side of the page.     You will be asked to enter the access code listed below, as well as some personal information. Please follow the directions to create your username " and password.     Your access code is: [email or web address]  Expires: 2018  8:30 AM     Your access code will  in 90 days. If you need help or a new code, please call your [facility] or [phone].        Care EveryWhere ID     This is your Care EveryWhere ID. This could be used by other organizations to access your [place] medical records  [id number]        Your Vitals Were     Last Period                   2008            Blood Pressure from Last 3 Encounters:   17 118/77   17 124/75   16 124/80    Weight from Last 3 Encounters:   17 105.9 kg (233 lb 6.4 oz)   17 107.6 kg (237 lb 3.2 oz)   16 102.7 kg (226 lb 6.4 oz)              We Performed the Following     Ca27.29  breast tumor marker     CBC with platelets differential     Comprehensive metabolic panel        Primary Care Provider Office Phone # Fax #    Chase Deysi Coyne [phone]       [facility] [address]  [facility] [address]        Equal Access to Services     [facility]: Hadii radha britton hadisrealo Soeusebio, waaxda lujuan jose, qaybta kaalmada lexis, heaven peraza . So [facility] [phone].    ATENCIÓN: Si habla español, tiene a aguilar disposición servicios gratuitos de asistencia lingüística. [facility] [phone].    We comply with applicable federal civil rights laws and [place] laws. We do not discriminate on the basis of race, color, national origin, age, disability, sex, sexual orientation, or gender identity.            Thank you!     Thank you for choosing [facility]  for your care. Our goal is always to provide you with excellent care. Hearing back from our patients is one way we can continue to improve our services. Please take a few minutes to complete the written survey that you may receive in the mail after your visit with us. Thank you!             Your Updated Medication List - Protect others around you: Learn how to safely use,  store and throw away your medicines at www.disposemymeds.org.          This list is accurate as of 2/6/18  8:30 AM.  Always use your most recent med list.                   Brand Name Dispense Instructions for use Diagnosis    LISINOPRIL PO      Take 10 mg by mouth daily        PROBIOTIC DAILY PO       DCIS (ductal carcinoma in situ) of breast, right

## 2018-02-09 ENCOUNTER — ONCOLOGY VISIT (OUTPATIENT)
Dept: ONCOLOGY | Facility: CLINIC | Age: 53
End: 2018-02-09
Attending: FAMILY MEDICINE
Payer: COMMERCIAL

## 2018-02-09 VITALS
RESPIRATION RATE: 16 BRPM | WEIGHT: 234.4 LBS | TEMPERATURE: 98 F | DIASTOLIC BLOOD PRESSURE: 81 MMHG | SYSTOLIC BLOOD PRESSURE: 137 MMHG | HEART RATE: 60 BPM | BODY MASS INDEX: 38.59 KG/M2 | OXYGEN SATURATION: 100 %

## 2018-02-09 DIAGNOSIS — D05.11 DUCTAL CARCINOMA IN SITU (DCIS) OF RIGHT BREAST: Primary | ICD-10-CM

## 2018-02-09 PROCEDURE — 99214 OFFICE O/P EST MOD 30 MIN: CPT | Performed by: INTERNAL MEDICINE

## 2018-02-09 PROCEDURE — G0463 HOSPITAL OUTPT CLINIC VISIT: HCPCS

## 2018-02-09 ASSESSMENT — PAIN SCALES - GENERAL: PAINLEVEL: NO PAIN (0)

## 2018-02-09 NOTE — MR AVS SNAPSHOT
After Visit Summary   2/9/2018    Kimberly Miller    MRN: [id number]           Patient Information     Date Of Birth          1965        Visit Information        Provider Department      2/9/2018 8:00 AM Becky Matta MD [facility]        Today's Diagnoses     Ductal carcinoma in situ (DCIS) of right breast    -  1      Care Instructions    Return to clinic in 6 months with labs a few days prior          Follow-ups after your visit        Your next 10 appointments already scheduled     Aug 07, 2018  8:15 AM CDT   Return Visit with  Oncology Nurse   [facility] ([facility])    [facility]  [address]  [place] [address] [phone]            Aug 10, 2018  8:00 AM CDT   Return Visit with Becky Matta MD   [facility] ([facility])    [facility]  [address]  [place] [address] [phone]              Future tests that were ordered for you today     Open Future Orders        Priority Expected Expires Ordered    Ca27.29  breast tumor marker Routine 8/9/2018 2/9/2019 2/9/2018    CBC with platelets differential Routine 8/9/2018 2/9/2019 2/9/2018    Comprehensive metabolic panel Routine 8/9/2018 2/9/2019 2/9/2018            Who to contact     If you have questions or need follow up information about today's clinic visit or your schedule please contact [facility] directly at [phone].  Normal or non-critical lab and imaging results will be communicated to you by MyChart, letter or phone within 4 business days after the clinic has received the results. If you do not hear from us within 7 days, please contact the clinic through LookIthart or phone. If you have a critical or abnormal lab result, we will notify you by phone as soon as possible.  Submit refill requests through [employer] or call your pharmacy and they will  "forward the refill request to us. Please allow 3 business days for your refill to be completed.          Additional Information About Your Visit        MyChart Information     Envis lets you send messages to your doctor, view your test results, renew your prescriptions, schedule appointments and more. To sign up, go to [email or web address] . Click on \"Log in\" on the left side of the screen, which will take you to the Welcome page. Then click on \"Sign up Now\" on the right side of the page.     You will be asked to enter the access code listed below, as well as some personal information. Please follow the directions to create your username and password.     Your access code is: [email or web address]  Expires: 2018  8:30 AM     Your access code will  in 90 days. If you need help or a new code, please call your [place] clinic or [phone].        Care EveryWhere ID     This is your Care EveryWhere ID. This could be used by other organizations to access your [place] medical records  [id number]        Your Vitals Were     Pulse Temperature Respirations Last Period Pulse Oximetry BMI (Body Mass Index)    60 98  F (36.7  C) (Oral) 16 2008 100% 38.59 kg/m2       Blood Pressure from Last 3 Encounters:   18 137/81   17 118/77   17 124/75    Weight from Last 3 Encounters:   18 106.3 kg (234 lb 6.4 oz)   17 105.9 kg (233 lb 6.4 oz)   17 107.6 kg (237 lb 3.2 oz)                 Today's Medication Changes          These changes are accurate as of 18  8:29 AM.  If you have any questions, ask your nurse or doctor.               Stop taking these medicines if you haven't already. Please contact your care team if you have questions.     PROBIOTIC DAILY PO                    Primary Care Provider Office Phone # Fax #    Chase Coyne [phone]       [address] [address]  [facility] [address]        Equal Access to Services     [facility]: Hadjayashree " radha Cabrera, carlos zapatarimaha, qaosmanta kafrandy geekezia, waxenedelia bella oliverajayytian peraza praveen. So [facility] [place] [phone].    ATENCIÓN: Si habla español, tiene a aguilar disposición servicios gratuitos de asistencia lingüística. Llame al [phone].    We comply with applicable federal civil rights laws and [place] laws. We do not discriminate on the basis of race, color, national origin, age, disability, sex, sexual orientation, or gender identity.            Thank you!     Thank you for choosing [facility]  for your care. Our goal is always to provide you with excellent care. Hearing back from our patients is one way we can continue to improve our services. Please take a few minutes to complete the written survey that you may receive in the mail after your visit with us. Thank you!             Your Updated Medication List - Protect others around you: Learn how to safely use, store and throw away your medicines at www.disposemymeds.org.          This list is accurate as of 2/9/18  8:29 AM.  Always use your most recent med list.                   Brand Name Dispense Instructions for use Diagnosis    LISINOPRIL PO      Take 10 mg by mouth daily        VITAMIN D (CHOLECALCIFEROL) PO      Take 2,000 Units by mouth daily    Ductal carcinoma in situ (DCIS) of right breast

## 2018-02-09 NOTE — LETTER
"    2/9/2018         RE: Kimberly Miller  [address] [facility] [address]        Dear Colleague,    Thank you for referring your patient, Kimberly Miller, to the [facility]. Please see a copy of my visit note below.    Oncology Rooming Note    February 9, 2018 7:55 AM   Kimberly Miller is a 53 year old female who presents for:    Chief Complaint   Patient presents with     Oncology Clinic Visit     Ductal carcinoma in situ (DCIS) of right breast     Initial Vitals: /81 (BP Location: Left arm, Patient Position: Sitting, Cuff Size: Adult Large)  Pulse 60  Temp 98  F (36.7  C) (Oral)  Resp 16  Wt 106.3 kg (234 lb 6.4 oz)  LMP 04/14/2008  SpO2 100%  BMI 38.59 kg/m2 Estimated body mass index is 38.59 kg/(m^2) as calculated from the following:    Height as of 2/2/17: 1.66 m (5' 5.35\").    Weight as of this encounter: 106.3 kg (234 lb 6.4 oz). Body surface area is 2.21 meters squared.  No Pain (0) Comment: Data Unavailable   Patient's last menstrual period was 04/14/2008.  Allergies reviewed: Yes  Medications reviewed: Yes    Medications: Medication refills not needed today.  Pharmacy name entered into [employer]:    [facility] [id number] [place], [place] - [address] [facility] [place], [place] - [address]    Clinical concerns: None                  4 minutes for nursing intake (face to face time)     Smiley Conley MA              [facility]    Hematology/Oncology Established Patient Note      Today's Date: 02/09/2018    Reason for Follow-up: BRCA 2 mutation and DCIS      HISTORY OF PRESENT ILLNESS: Kimberly Miller is a 53 year old female with history of HTN, who presents with DCIS.  She has significant family history of breast and ovarian cancer.  She is BRCA2 positive, and has been followed closely previously by Dr. Reed for high-risk.  On a surveillance MRI breast on 12/8/15, there was abnormal finding in the " right breast, and ultimately was found to have DCIS.  On 1/28/16, she underwent bilateral mastectomy by Dr. Alexander at [facility], with pathology showing DCIS of the right breast, grade 2 with occasional nuclear grade 3 cells, size 1.0 x 0.5 cm, negative for invasive carcinoma, 0/1 lymph node with carcinoma, ER positive at 75%, and WI positive at 20%.  She has undergone reconstruction.      She has history of SUMAYA-BSO in April 2008.  She notes that she still gets hot flashes occasionally.        INTERIM HISTORY: Kimberly comes in for follow-up today.  She says that she has been feeling better since her last visit.  She underwent fat grafting surgery with Dr. Valente, and her implants now feel much better.  She had been having discomfort there and it has improved since the surgery.  She still does stretching exercises and that has helped as well.  She denies new breast lumps/bumps, pains, shortness of breath, or chest pain.        REVIEW OF SYSTEMS:   14 point ROS was reviewed and is negative other than as noted above in HPI.       HOME MEDICATIONS:  Current Outpatient Prescriptions   Medication Sig Dispense Refill     VITAMIN D, CHOLECALCIFEROL, PO Take 2,000 Units by mouth daily       LISINOPRIL PO Take 10 mg by mouth daily           ALLERGIES:  Allergies   Allergen Reactions     Penicillin [Esters] Rash         PAST MEDICAL HISTORY:  Past Medical History:   Diagnosis Date     Family history of breast cancer      Family history of ovarian cancer      Hypertension          PAST SURGICAL HISTORY:  Past Surgical History:   Procedure Laterality Date     BIOPSY BREAST Right 12/18/2015    MRI Biopsy     COLONOSCOPY  10/1/2012    Procedure: COLONOSCOPY;  Colonoscopy;  Surgeon: Karma Oates MD;  Location: [facility]     HC BIOPSY BREAST, PERC NEEDLE CORE, NO IMAGING Right 1/9/2008     - Benign     HC REMOVAL GALLBLADDER  1993         SOCIAL HISTORY:  Social History     Social History     Marital status:       Spouse name: N/A     Number of children: N/A     Years of education: N/A     Occupational History     Not on file.     Social History Main Topics     Smoking status: Never Smoker     Smokeless tobacco: Never Used     Alcohol use Yes      Comment: rarely     Drug use: No     Sexual activity: Yes     Partners: Male     Other Topics Concern     Parent/Sibling W/ Cabg, Mi Or Angioplasty Before 65f 55m? No     Social History Narrative         FAMILY HISTORY:  Family History   Problem Relation Age of Onset     C.A.D. Mother      long QT syndrome     Genetic Disorder Father      + BRCA 2; no cancer     CANCER Father 81     CMML     CANCER Sister      breast- + BRCA 2     CANCER Brother      lung diagnosed age 36     CANCER Maternal Grandmother      ovarian     CANCER Maternal Grandfather      lung     CANCER Other      distant relative with breast cancer; male     CANCER Other      distant relative with breast cancer; female     CANCER Other      distant relative with breast cancer; female         PHYSICAL EXAM:  Vital signs:  /81 (BP Location: Left arm, Patient Position: Sitting, Cuff Size: Adult Large)  Pulse 60  Temp 98  F (36.7  C) (Oral)  Resp 16  Wt 106.3 kg (234 lb 6.4 oz)  LMP 2008  SpO2 100%  BMI 38.59 kg/m2   ECO  GENERAL/CONSTITUTIONAL: No acute distress.  EYES: No scleral icterus.  LYMPH: No anterior cervical, posterior cervical, supraclavicular, or axillary adenopathy.   RESPIRATORY: Clear to auscultation bilaterally. No crackles or wheezing.   CARDIOVASCULAR: Regular rate and rhythm without murmurs, gallops, or rubs.  GASTROINTESTINAL: No tenderness. The patient has normal bowel sounds. No guarding.  No distention.  BREAST: s/p bilateral mastectomies with reconstruction.  MUSCULOSKELETAL: Warm and well-perfused.  NEUROLOGIC: Alert, oriented, answers questions appropriately.  INTEGUMENTARY: No rashes or jaundice.  GAIT: Steady, does not use assistive device      LABS:  CBC RESULTS:    Recent Labs   Lab Test  02/06/18   0820   WBC  8.2   RBC  4.65   HGB  14.9   HCT  42.2   MCV  91   MCH  32.0   MCHC  35.3   RDW  12.2   PLT  285     Recent Labs   Lab Test  02/06/18   0820  07/31/17   0910   NA  140  140   POTASSIUM  3.9  4.1   CHLORIDE  105  107   CO2  29  25   ANIONGAP  6  8   GLC  93  79   BUN  14  14   CR  0.79  0.69   MILY  8.7  8.8     Lab Results   Component Value Date    AST 14 02/06/2018     Lab Results   Component Value Date    ALT 34 02/06/2018     No results found for: BILICONJ   Lab Results   Component Value Date    BILITOTAL 0.7 02/06/2018     Lab Results   Component Value Date    ALBUMIN 3.7 02/06/2018     Lab Results   Component Value Date    PROTTOTAL 7.6 02/06/2018      Lab Results   Component Value Date    ALKPHOS 96 02/06/2018     Component      Latest Ref Rng & Units 5/23/2013 11/25/2013 2/2/2017 7/31/2017   CA 27-29      0 - 39 U/mL 16 13 14 9     Component      Latest Ref Rng & Units 2/6/2018   CA 27-29      0 - 39 U/mL 10       ASSESSMENT/PLAN:  Kimberly Miller is a 53 year old post-menopausal female with:    1) DCIS of the right breast: ER positive, [place] positive.  +BRCA2 mutation.  She is s/p bilateral mastectomy with reconstruction.  She has also had SUMAYA-BSO in 2008.  We previously discussed chemoprevention with tamoxifen or AI, weighing the benefits and risks/toxicities.  Ultimately, as she has had a risk-reducing bilateral mastectomy, she decided not to start hormonal therapy, and opting for surveillance with labs and tumor markers.  Will obtain imaging only if symptoms arise.    She has had no evidence of recurrence on exam.  Recent labs reviewed - normal.  She would like to continue routine labs and tumor marker checks.  -RTC in 6 months with labs: CBC, CMP, CA 27-29    2) Breast reconstruction: She has fat grafting surgery in November 2017, and there is now less discomfort/pain at her implant area.  She continues to do stretching exercises for  lymphedema/scarring.  -follows with Dr. Valente    3) She declined flu shot today.      I spent a total of 25 minutes with the patient, with over >50% of the time in counseling and/or coordination of care.      Becky Matta MD  Hematology/Oncology  [facility]        Again, thank you for allowing me to participate in the care of your patient.        Sincerely,        Becky Matta MD

## 2018-02-09 NOTE — PROGRESS NOTES
"Oncology Rooming Note    February 9, 2018 7:55 AM   Kimberly Miller is a 53 year old female who presents for:    Chief Complaint   Patient presents with     Oncology Clinic Visit     Ductal carcinoma in situ (DCIS) of right breast     Initial Vitals: /81 (BP Location: Left arm, Patient Position: Sitting, Cuff Size: Adult Large)  Pulse 60  Temp 98  F (36.7  C) (Oral)  Resp 16  Wt 106.3 kg (234 lb 6.4 oz)  LMP 04/14/2008  SpO2 100%  BMI 38.59 kg/m2 Estimated body mass index is 38.59 kg/(m^2) as calculated from the following:    Height as of 2/2/17: 1.66 m (5' 5.35\").    Weight as of this encounter: 106.3 kg (234 lb 6.4 oz). Body surface area is 2.21 meters squared.  No Pain (0) Comment: Data Unavailable   Patient's last menstrual period was 04/14/2008.  Allergies reviewed: Yes  Medications reviewed: Yes    Medications: Medication refills not needed today.  Pharmacy name entered into [facility]:    [facility] [id number] [place], [place] - [address] [facility] [place], [place] - [address]    Clinical concerns: None                  4 minutes for nursing intake (face to face time)     Smiley Conley MA            "

## 2018-02-09 NOTE — PROGRESS NOTES
[facility]    Hematology/Oncology Established Patient Note      Today's Date: 02/09/2018    Reason for Follow-up: BRCA 2 mutation and DCIS      HISTORY OF PRESENT ILLNESS: Kimberly Miller is a 53 year old female with history of HTN, who presents with DCIS.  She has significant family history of breast and ovarian cancer.  She is BRCA2 positive, and has been followed closely previously by Dr. Reed for high-risk.  On a surveillance MRI breast on 12/8/15, there was abnormal finding in the right breast, and ultimately was found to have DCIS.  On 1/28/16, she underwent bilateral mastectomy by Dr. Alexander at [facility], with pathology showing DCIS of the right breast, grade 2 with occasional nuclear grade 3 cells, size 1.0 x 0.5 cm, negative for invasive carcinoma, 0/1 lymph node with carcinoma, ER positive at 75%, and [place] positive at 20%.  She has undergone reconstruction.      She has history of SUMAYA-BSO in April 2008.  She notes that she still gets hot flashes occasionally.        INTERIM HISTORY: Kimberly comes in for follow-up today.  She says that she has been feeling better since her last visit.  She underwent fat grafting surgery with Dr. Valente, and her implants now feel much better.  She had been having discomfort there and it has improved since the surgery.  She still does stretching exercises and that has helped as well.  She denies new breast lumps/bumps, pains, shortness of breath, or chest pain.        REVIEW OF SYSTEMS:   14 point ROS was reviewed and is negative other than as noted above in HPI.       HOME MEDICATIONS:  Current Outpatient Prescriptions   Medication Sig Dispense Refill     VITAMIN D, CHOLECALCIFEROL, PO Take 2,000 Units by mouth daily       LISINOPRIL PO Take 10 mg by mouth daily           ALLERGIES:  Allergies   Allergen Reactions     Penicillin [Esters] Rash         PAST MEDICAL HISTORY:  Past Medical History:   Diagnosis Date     Family history of  breast cancer      Family history of ovarian cancer      Hypertension          PAST SURGICAL HISTORY:  Past Surgical History:   Procedure Laterality Date     BIOPSY BREAST Right 2015    MRI Biopsy     COLONOSCOPY  10/1/2012    Procedure: COLONOSCOPY;  Colonoscopy;  Surgeon: Karma Oates MD;  Location: [facility]     HC BIOPSY BREAST, PERC NEEDLE CORE, NO IMAGING Right 2008     - Benign     HC REMOVAL GALLBLADDER  1993         SOCIAL HISTORY:  Social History     Social History     Marital status:      Spouse name: N/A     Number of children: N/A     Years of education: N/A     Occupational History     Not on file.     Social History Main Topics     Smoking status: Never Smoker     Smokeless tobacco: Never Used     Alcohol use Yes      Comment: rarely     Drug use: No     Sexual activity: Yes     Partners: Male     Other Topics Concern     Parent/Sibling W/ Cabg, Mi Or Angioplasty Before 65f 55m? No     Social History Narrative         FAMILY HISTORY:  Family History   Problem Relation Age of Onset     C.A.D. Mother      long QT syndrome     Genetic Disorder Father      + BRCA 2; no cancer     CANCER Father 81     CMML     CANCER Sister      breast- + BRCA 2     CANCER Brother      lung diagnosed age 36     CANCER Maternal Grandmother      ovarian     CANCER Maternal Grandfather      lung     CANCER Other      distant relative with breast cancer; male     CANCER Other      distant relative with breast cancer; female     CANCER Other      distant relative with breast cancer; female         PHYSICAL EXAM:  Vital signs:  /81 (BP Location: Left arm, Patient Position: Sitting, Cuff Size: Adult Large)  Pulse 60  Temp 98  F (36.7  C) (Oral)  Resp 16  Wt 106.3 kg (234 lb 6.4 oz)  LMP 2008  SpO2 100%  BMI 38.59 kg/m2   ECO  GENERAL/CONSTITUTIONAL: No acute distress.  EYES: No scleral icterus.  LYMPH: No anterior cervical, posterior cervical, supraclavicular, or axillary adenopathy.    RESPIRATORY: Clear to auscultation bilaterally. No crackles or wheezing.   CARDIOVASCULAR: Regular rate and rhythm without murmurs, gallops, or rubs.  GASTROINTESTINAL: No tenderness. The patient has normal bowel sounds. No guarding.  No distention.  BREAST: s/p bilateral mastectomies with reconstruction.  MUSCULOSKELETAL: Warm and well-perfused.  NEUROLOGIC: Alert, oriented, answers questions appropriately.  INTEGUMENTARY: No rashes or jaundice.  GAIT: Steady, does not use assistive device      LABS:  CBC RESULTS:   Recent Labs   Lab Test  02/06/18   0820   WBC  8.2   RBC  4.65   HGB  14.9   HCT  42.2   MCV  91   MCH  32.0   MCHC  35.3   RDW  12.2   PLT  285     Recent Labs   Lab Test  02/06/18   0820  07/31/17   0910   NA  140  140   POTASSIUM  3.9  4.1   CHLORIDE  105  107   CO2  29  25   ANIONGAP  6  8   GLC  93  79   BUN  14  14   CR  0.79  0.69   MILY  8.7  8.8     Lab Results   Component Value Date    AST 14 02/06/2018     Lab Results   Component Value Date    ALT 34 02/06/2018     No results found for: BILICONJ   Lab Results   Component Value Date    BILITOTAL 0.7 02/06/2018     Lab Results   Component Value Date    ALBUMIN 3.7 02/06/2018     Lab Results   Component Value Date    PROTTOTAL 7.6 02/06/2018      Lab Results   Component Value Date    ALKPHOS 96 02/06/2018     Component      Latest Ref Rng & Units 5/23/2013 11/25/2013 2/2/2017 7/31/2017   CA 27-29      0 - 39 U/mL 16 13 14 9     Component      Latest Ref Rng & Units 2/6/2018   CA 27-29      0 - 39 U/mL 10       ASSESSMENT/PLAN:  Kimberly Miller is a 53 year old post-menopausal female with:    1) DCIS of the right breast: ER positive, [place] positive.  +BRCA2 mutation.  She is s/p bilateral mastectomy with reconstruction.  She has also had SUMAYA-BSO in 2008.  We previously discussed chemoprevention with tamoxifen or AI, weighing the benefits and risks/toxicities.  Ultimately, as she has had a risk-reducing bilateral mastectomy, she decided not to  start hormonal therapy, and opting for surveillance with labs and tumor markers.  Will obtain imaging only if symptoms arise.    She has had no evidence of recurrence on exam.  Recent labs reviewed - normal.  She would like to continue routine labs and tumor marker checks.  -RTC in 6 months with labs: CBC, CMP, CA 27-29    2) Breast reconstruction: She has fat grafting surgery in November 2017, and there is now less discomfort/pain at her implant area.  She continues to do stretching exercises for lymphedema/scarring.  -follows with Dr. Valente    3) She declined flu shot today.      I spent a total of 25 minutes with the patient, with over >50% of the time in counseling and/or coordination of care.      Becky Matta MD  Hematology/Oncology  [facility]

## 2018-06-16 ENCOUNTER — HEALTH MAINTENANCE LETTER (OUTPATIENT)
Age: 53
End: 2018-06-16

## 2018-07-20 NOTE — PATIENT INSTRUCTIONS
Return to clinic in 6 months with labs a few days prior Scheduled/Beth MILLARDS printed and given to patient/Beth  
Nir

## 2018-08-07 ENCOUNTER — ONCOLOGY VISIT (OUTPATIENT)
Dept: ONCOLOGY | Facility: CLINIC | Age: 53
End: 2018-08-07
Attending: INTERNAL MEDICINE
Payer: COMMERCIAL

## 2018-08-07 ENCOUNTER — HOSPITAL ENCOUNTER (OUTPATIENT)
Facility: CLINIC | Age: 53
Setting detail: SPECIMEN
Discharge: HOME | End: 2018-08-07
Attending: INTERNAL MEDICINE | Admitting: INTERNAL MEDICINE
Payer: COMMERCIAL

## 2018-08-07 DIAGNOSIS — D05.11 DUCTAL CARCINOMA IN SITU (DCIS) OF RIGHT BREAST: ICD-10-CM

## 2018-08-07 LAB
ALBUMIN SERPL-MCNC: 3.6 G/DL (ref 3.4–5)
ALP SERPL-CCNC: 97 U/L (ref 40–150)
ALT SERPL W P-5'-P-CCNC: 21 U/L (ref 0–50)
ANION GAP SERPL CALCULATED.3IONS-SCNC: 2 MMOL/L (ref 3–14)
AST SERPL W P-5'-P-CCNC: 9 U/L (ref 0–45)
BASOPHILS # BLD AUTO: 0.1 10E9/L (ref 0–0.2)
BASOPHILS NFR BLD AUTO: 0.7 %
BILIRUB SERPL-MCNC: 0.7 MG/DL (ref 0.2–1.3)
BUN SERPL-MCNC: 18 MG/DL (ref 7–30)
CALCIUM SERPL-MCNC: 9 MG/DL (ref 8.5–10.1)
CANCER AG27-29 SERPL-ACNC: 13 U/ML (ref 0–39)
CHLORIDE SERPL-SCNC: 104 MMOL/L (ref 94–109)
CO2 SERPL-SCNC: 34 MMOL/L (ref 20–32)
CREAT SERPL-MCNC: 0.72 MG/DL (ref 0.52–1.04)
DIFFERENTIAL METHOD BLD: NORMAL
EOSINOPHIL # BLD AUTO: 0.1 10E9/L (ref 0–0.7)
EOSINOPHIL NFR BLD AUTO: 1.1 %
ERYTHROCYTE [DISTWIDTH] IN BLOOD BY AUTOMATED COUNT: 12.2 % (ref 10–15)
GFR SERPL CREATININE-BSD FRML MDRD: 84 ML/MIN/1.7M2
GLUCOSE SERPL-MCNC: 79 MG/DL (ref 70–99)
HCT VFR BLD AUTO: 41.8 % (ref 35–47)
HGB BLD-MCNC: 14.6 G/DL (ref 11.7–15.7)
IMM GRANULOCYTES # BLD: 0 10E9/L (ref 0–0.4)
IMM GRANULOCYTES NFR BLD: 0.2 %
LYMPHOCYTES # BLD AUTO: 3 10E9/L (ref 0.8–5.3)
LYMPHOCYTES NFR BLD AUTO: 37.2 %
MCH RBC QN AUTO: 31.9 PG (ref 26.5–33)
MCHC RBC AUTO-ENTMCNC: 34.9 G/DL (ref 31.5–36.5)
MCV RBC AUTO: 91 FL (ref 78–100)
MONOCYTES # BLD AUTO: 0.6 10E9/L (ref 0–1.3)
MONOCYTES NFR BLD AUTO: 7.7 %
NEUTROPHILS # BLD AUTO: 4.2 10E9/L (ref 1.6–8.3)
NEUTROPHILS NFR BLD AUTO: 53.1 %
NRBC # BLD AUTO: 0 10*3/UL
NRBC BLD AUTO-RTO: 0 /100
PLATELET # BLD AUTO: 273 10E9/L (ref 150–450)
POTASSIUM SERPL-SCNC: 4.9 MMOL/L (ref 3.4–5.3)
PROT SERPL-MCNC: 7.4 G/DL (ref 6.8–8.8)
RBC # BLD AUTO: 4.58 10E12/L (ref 3.8–5.2)
SODIUM SERPL-SCNC: 140 MMOL/L (ref 133–144)
WBC # BLD AUTO: 8 10E9/L (ref 4–11)

## 2018-08-07 PROCEDURE — 80053 COMPREHEN METABOLIC PANEL: CPT | Performed by: INTERNAL MEDICINE

## 2018-08-07 PROCEDURE — 85025 COMPLETE CBC W/AUTO DIFF WBC: CPT | Performed by: INTERNAL MEDICINE

## 2018-08-07 PROCEDURE — 36415 COLL VENOUS BLD VENIPUNCTURE: CPT

## 2018-08-07 PROCEDURE — 86300 IMMUNOASSAY TUMOR CA 15-3: CPT | Performed by: INTERNAL MEDICINE

## 2018-08-07 NOTE — LETTER
8/7/2018         RE: Kimberly Miller  [address]  [place] [address]        Dear Colleague,    Thank you for referring your patient, Kimberly Miller, to the [facility]. Please see a copy of my visit note below.    Medical Assistant Note:  Kimberly Miller presents today for lab draw.    Patient seen by provider today: No.   present during visit today: Not Applicable.    Concerns: No Concerns.    Procedure:  Lab draw site: LAC, Needle type: BF, Gauge: 23g with gauze and coban.    Post Assessment:  Labs drawn without difficulty: Yes.    Discharge Plan:  Departure Mode: Ambulatory..    Face to Face Time: 4mins      Edie Gilbert CMA                Again, thank you for allowing me to participate in the care of your patient.        Sincerely,        Oncology Nurse

## 2018-08-07 NOTE — MR AVS SNAPSHOT
After Visit Summary   8/7/2018    Kimberly Miller    MRN: [id number]           Patient Information     Date Of Birth          1965        Visit Information        Provider Department      8/7/2018 8:15 AM Nurse,  Oncology [facility]        Today's Diagnoses     Ductal carcinoma in situ (DCIS) of right breast           Follow-ups after your visit        Your next 10 appointments already scheduled     Aug 10, 2018  8:00 AM CDT   Return Visit with Becky Matta MD   [facility]    [facility]  [address]  [facility] [id number] [phone]              Who to contact     If you have questions or need follow up information about today's clinic visit or your schedule please contact [facility] directly at [phone].  Normal or non-critical lab and imaging results will be communicated to you by MyChart, letter or phone within 4 business days after the clinic has received the results. If you do not hear from us within 7 days, please contact the clinic through [facility] or phone. If you have a critical or abnormal lab result, we will notify you by phone as soon as possible.  Submit refill requests through [employer] or call your pharmacy and they will forward the refill request to us. Please allow 3 business days for your refill to be completed.          Additional Information About Your Visit        MyChart Information     [employer] gives you secure access to your electronic health record. If you see a primary care provider, you can also send messages to your care team and make appointments. If you have questions, please call your primary care clinic.  If you do not have a primary care provider, please call [phone] and they will assist you.        Care EveryWhere ID     This is your Care EveryWhere ID. This could be used by other organizations to access your [facility]  records  [id number]        Your Vitals Were     Last Period                   04/14/2008            Blood Pressure from Last 3 Encounters:   02/09/18 137/81   08/04/17 118/77   02/02/17 124/75    Weight from Last 3 Encounters:   02/09/18 106.3 kg (234 lb 6.4 oz)   08/04/17 105.9 kg (233 lb 6.4 oz)   02/02/17 107.6 kg (237 lb 3.2 oz)              We Performed the Following     Ca27.29  breast tumor marker     CBC with platelets differential     Comprehensive metabolic panel        Primary Care Provider Office Phone # Fax #    Chase Deysi Coyne [phone]       [address]  [facility] [address]        Equal Access to Services     [place] : Howie Cabrera, waaxmando luqadaha, qaybta kaalmada lexis, heaven peraza . So [facility] [phone].    ATENCIÓN: Si habla español, tiene a aguilar disposición servicios gratuitos de asistencia lingüística. [facility] [phone].    We comply with applicable federal civil rights laws and [place] laws. We do not discriminate on the basis of race, color, national origin, age, disability, sex, sexual orientation, or gender identity.            Thank you!     Thank you for choosing [facility]  for your care. Our goal is always to provide you with excellent care. Hearing back from our patients is one way we can continue to improve our services. Please take a few minutes to complete the written survey that you may receive in the mail after your visit with us. Thank you!             Your Updated Medication List - Protect others around you: Learn how to safely use, store and throw away your medicines at www.disposemymeds.org.          This list is accurate as of 8/7/18  8:16 AM.  Always use your most recent med list.                   Brand Name Dispense Instructions for use Diagnosis    LISINOPRIL PO      Take 10 mg by mouth daily        VITAMIN D (CHOLECALCIFEROL) PO      Take 2,000 Units by mouth  daily    Ductal carcinoma in situ (DCIS) of right breast

## 2018-08-07 NOTE — PROGRESS NOTES
Medical Assistant Note:  Kimberly Miller presents today for lab draw.    Patient seen by provider today: No.   present during visit today: Not Applicable.    Concerns: No Concerns.    Procedure:  Lab draw site: LAC, Needle type: BF, Gauge: 23g with gauze and coban.    Post Assessment:  Labs drawn without difficulty: Yes.    Discharge Plan:  Departure Mode: Ambulatory..    Face to Face Time: 4mins      Edie Gilbert CMA

## 2018-08-10 ENCOUNTER — ONCOLOGY VISIT (OUTPATIENT)
Dept: ONCOLOGY | Facility: CLINIC | Age: 53
End: 2018-08-10
Attending: INTERNAL MEDICINE
Payer: COMMERCIAL

## 2018-08-10 VITALS
TEMPERATURE: 98.2 F | RESPIRATION RATE: 16 BRPM | SYSTOLIC BLOOD PRESSURE: 117 MMHG | BODY MASS INDEX: 36.12 KG/M2 | HEART RATE: 46 BPM | WEIGHT: 219.4 LBS | DIASTOLIC BLOOD PRESSURE: 78 MMHG | OXYGEN SATURATION: 99 %

## 2018-08-10 DIAGNOSIS — D05.11 DUCTAL CARCINOMA IN SITU (DCIS) OF RIGHT BREAST: Primary | ICD-10-CM

## 2018-08-10 PROCEDURE — 99214 OFFICE O/P EST MOD 30 MIN: CPT | Performed by: INTERNAL MEDICINE

## 2018-08-10 PROCEDURE — G0463 HOSPITAL OUTPT CLINIC VISIT: HCPCS

## 2018-08-10 ASSESSMENT — PAIN SCALES - GENERAL: PAINLEVEL: NO PAIN (0)

## 2018-08-10 NOTE — PROGRESS NOTES
"Oncology Rooming Note    August 10, 2018 7:59 AM   Kimberly Miller is a 53 year old female who presents for:    Chief Complaint   Patient presents with     Oncology Clinic Visit     Ductal carcinoma in situ (DCIS) of right breast     Initial Vitals: /78 (BP Location: Left arm, Patient Position: Sitting, Cuff Size: Adult Large)  Pulse (!) 46  Temp 98.2  F (36.8  C) (Oral)  Resp 16  Wt 99.5 kg (219 lb 6.4 oz)  LMP 04/14/2008  SpO2 99%  BMI 36.12 kg/m2 Estimated body mass index is 36.12 kg/(m^2) as calculated from the following:    Height as of 2/2/17: 1.66 m (5' 5.35\").    Weight as of this encounter: 99.5 kg (219 lb 6.4 oz). Body surface area is 2.14 meters squared.  No Pain (0) Comment: Data Unavailable   Patient's last menstrual period was 04/14/2008.  Allergies reviewed: Yes  Medications reviewed: Yes    Medications: Medication refills not needed today.  Pharmacy name entered into [facility]:    [id number] [place] [place] - [address] [facility] [place], [place] - [address]    Clinical concerns: None                    4 minutes for nursing intake (face to face time)     Smiley Conley MA            "

## 2018-08-10 NOTE — LETTER
"    8/10/2018         RE: Kimberly Miller  [address] [facility] [address]        Dear Colleague,    Thank you for referring your patient, Kimberly Miller, to the [facility]. Please see a copy of my visit note below.    Oncology Rooming Note    August 10, 2018 7:59 AM   Kimberly Miller is a 53 year old female who presents for:    Chief Complaint   Patient presents with     Oncology Clinic Visit     Ductal carcinoma in situ (DCIS) of right breast     Initial Vitals: /78 (BP Location: Left arm, Patient Position: Sitting, Cuff Size: Adult Large)  Pulse (!) 46  Temp 98.2  F (36.8  C) (Oral)  Resp 16  Wt 99.5 kg (219 lb 6.4 oz)  LMP 04/14/2008  SpO2 99%  BMI 36.12 kg/m2 Estimated body mass index is 36.12 kg/(m^2) as calculated from the following:    Height as of 2/2/17: 1.66 m (5' 5.35\").    Weight as of this encounter: 99.5 kg (219 lb 6.4 oz). Body surface area is 2.14 meters squared.  No Pain (0) Comment: Data Unavailable   Patient's last menstrual period was 04/14/2008.  Allergies reviewed: Yes  Medications reviewed: Yes    Medications: Medication refills not needed today.  Pharmacy name entered into [employer]:    [id number] [place] [facility] [address] [facility] [address]    Clinical concerns: None                    4 minutes for nursing intake (face to face time)     Smiley Conley MA              [facility]    Hematology/Oncology Established Patient Note      Today's Date: 08/10/2018    Reason for Follow-up: BRCA 2 mutation and DCIS      HISTORY OF PRESENT ILLNESS: Kimberly Miller is a 53 year old female with history of HTN, who presents with DCIS.  She has significant family history of breast and ovarian cancer.  She is BRCA2 positive, and has been followed closely previously by Dr. Reed for high-risk.  On a surveillance MRI breast on 12/8/15, there was abnormal finding in " the right breast, and ultimately was found to have DCIS.  On 1/28/16, she underwent bilateral mastectomy by Dr. Alexander at [facility], with pathology showing DCIS of the right breast, grade 2 with occasional nuclear grade 3 cells, size 1.0 x 0.5 cm, negative for invasive carcinoma, 0/1 lymph node with carcinoma, ER positive at 75%, and ID positive at 20%.  She has undergone reconstruction.      She has history of SUMAYA-BSO in April 2008.  She notes that she still gets hot flashes occasionally.        INTERIM HISTORY: Kimberly comes in for follow-up today.  She has been feeling well.  She notes that she has been very busy with work and with her family life.  She and her  are moving one of her sons to college at [employer] next weekend.  Her daughter is getting  the weekend after that.  Her other son is in graduate school studying physical therapy.  She denies any new lumps/bumps or new pains.  She felt a tiny lump on her implant, and saw Dr. Valente, as she had revision and fat grafting surgery in November 2017.  It was felt that it was an expected changed from the surgery and reassured.      REVIEW OF SYSTEMS:   14 point ROS was reviewed and is negative other than as noted above in HPI.       HOME MEDICATIONS:  Current Outpatient Prescriptions   Medication Sig Dispense Refill     LISINOPRIL PO Take 10 mg by mouth daily       VITAMIN D, CHOLECALCIFEROL, PO Take 2,000 Units by mouth daily           ALLERGIES:  Allergies   Allergen Reactions     Penicillin [Penicillins] Rash         PAST MEDICAL HISTORY:  Past Medical History:   Diagnosis Date     Family history of breast cancer      Family history of ovarian cancer      Hypertension          PAST SURGICAL HISTORY:  Past Surgical History:   Procedure Laterality Date     BIOPSY BREAST Right 12/18/2015    MRI Biopsy     COLONOSCOPY  10/1/2012    Procedure: COLONOSCOPY;  Colonoscopy;  Surgeon: Karma Oates MD;  Location: [facility] BIOPSY BREAST, [facility]  NEEDLE CORE, NO IMAGING Right 2008     - Benign     HC REMOVAL GALLBLADDER           SOCIAL HISTORY:  Social History     Social History     Marital status:      Spouse name: N/A     Number of children: N/A     Years of education: N/A     Occupational History     Not on file.     Social History Main Topics     Smoking status: Never Smoker     Smokeless tobacco: Never Used     Alcohol use Yes      Comment: rarely     Drug use: No     Sexual activity: Yes     Partners: Male     Other Topics Concern     Parent/Sibling W/ Cabg, Mi Or Angioplasty Before 65f 55m? No     Social History Narrative         FAMILY HISTORY:  Family History   Problem Relation Age of Onset     C.A.D. Mother      long QT syndrome     Genetic Disorder Father      + BRCA 2; no cancer     Cancer Father 81     CMML     Cancer Sister      breast- + BRCA 2     Cancer Brother      lung diagnosed age 36     Cancer Maternal Grandmother      ovarian     Cancer Maternal Grandfather      lung     Cancer Other      distant relative with breast cancer; male     Cancer Other      distant relative with breast cancer; female     Cancer Other      distant relative with breast cancer; female         PHYSICAL EXAM:  Vital signs:  /78 (BP Location: Left arm, Patient Position: Sitting, Cuff Size: Adult Large)  Pulse (!) 46  Temp 98.2  F (36.8  C) (Oral)  Resp 16  Wt 99.5 kg (219 lb 6.4 oz)  LMP 2008  SpO2 99%  BMI 36.12 kg/m2   ECO  GENERAL/CONSTITUTIONAL: No acute distress.  EYES: No scleral icterus.  LYMPH: No anterior cervical, posterior cervical, supraclavicular, or axillary adenopathy.   RESPIRATORY: Clear to auscultation bilaterally. No crackles or wheezing.   CARDIOVASCULAR: Regular rate and rhythm without murmurs, gallops, or rubs.  GASTROINTESTINAL: No tenderness. The patient has normal bowel sounds. No guarding.  No distention.  BREAST: s/p bilateral mastectomies with reconstruction.  MUSCULOSKELETAL: Warm and  well-perfused.  NEUROLOGIC: Alert, oriented, answers questions appropriately.  INTEGUMENTARY: No rashes or jaundice.  GAIT: Steady, does not use assistive device      LABS:  CBC RESULTS:   Recent Labs   Lab Test  08/07/18   0815   WBC  8.0   RBC  4.58   HGB  14.6   HCT  41.8   MCV  91   MCH  31.9   MCHC  34.9   RDW  12.2   PLT  273     Recent Labs   Lab Test  08/07/18   0815  02/06/18   0820   NA  140  140   POTASSIUM  4.9  3.9   CHLORIDE  104  105   CO2  34*  29   ANIONGAP  2*  6   GLC  79  93   BUN  18  14   CR  0.72  0.79   MILY  9.0  8.7     Lab Results   Component Value Date    AST 9 08/07/2018     Lab Results   Component Value Date    ALT 21 08/07/2018     No results found for: BILICONJ   Lab Results   Component Value Date    BILITOTAL 0.7 08/07/2018     Lab Results   Component Value Date    ALBUMIN 3.6 08/07/2018     Lab Results   Component Value Date    PROTTOTAL 7.4 08/07/2018      Lab Results   Component Value Date    ALKPHOS 97 08/07/2018     Component      Latest Ref Rng & Units 11/25/2013 2/2/2017 7/31/2017 2/6/2018   CA 27-29      0 - 39 U/mL 13 14 9 10     Component      Latest Ref Rng & Units 8/7/2018   CA 27-29      0 - 39 U/mL 13         ASSESSMENT/PLAN:  Kimberly Miller is a 53 year old post-menopausal female with:    1) DCIS of the right breast: ER positive, SC positive.  +BRCA2 mutation.  She is s/p bilateral mastectomy with reconstruction.  She has also had SUMAYA-BSO in 2008.  We previously discussed chemoprevention with tamoxifen or AI, weighing the benefits and risks/toxicities.  Ultimately, as she has had a risk-reducing bilateral mastectomy, she decided not to start hormonal therapy, and opting for surveillance with labs and tumor markers.  Will obtain imaging only if symptoms arise.    She has had no evidence of recurrence on exam.  Recent labs reviewed - unremarkable.  She would like to continue routine labs and tumor marker checks.  -RTC in 6 months with labs: CBC, CMP, CA 27-29    2)  Breast reconstruction: She has fat grafting surgery in November 2017 and now doing much better.  She continues to do stretching exercises for lymphedema/scarring.  -follows with Dr. Valente        I spent a total of 25 minutes with the patient, with over >50% of the time in counseling and/or coordination of care.      Becky Matta MD  Hematology/Oncology  [facility]        Again, thank you for allowing me to participate in the care of your patient.        Sincerely,        Becky Matta MD

## 2018-08-10 NOTE — PATIENT INSTRUCTIONS
Return to clinic in 6 months with labs a few days prior  Scheduled/Genna STAFFORD declined, patient wanted appointment card instead. Genna

## 2018-08-10 NOTE — MR AVS SNAPSHOT
After Visit Summary   8/10/2018    Kimberly Miller    MRN: [id number]           Patient Information     Date Of Birth          1965        Visit Information        Provider Department      8/10/2018 8:00 AM Becky Matta MD [facility]        Today's Diagnoses     Ductal carcinoma in situ (DCIS) of right breast    -  1      Care Instructions    Return to clinic in 6 months with labs a few days prior          Follow-ups after your visit        Your next 10 appointments already scheduled     Feb 05, 2019  8:00 AM CST   Return Visit with  Oncology Nurse   [facility] ([facility])    [facility]  [address]  [place] [address] [phone]            Feb 12, 2019  8:00 AM CST   Return Visit with Becky Matta MD   [facility]    [facility] [place]  [address]  [place] [address] [phone]              Future tests that were ordered for you today     Open Future Orders        Priority Expected Expires Ordered    Comprehensive metabolic panel Routine 2/10/2019 8/10/2019 8/10/2018    CBC with platelets differential Routine 2/10/2019 8/10/2019 8/10/2018    Ca27.29  breast tumor marker Routine 2/10/2019 8/10/2019 8/10/2018            Who to contact     If you have questions or need follow up information about today's clinic visit or your schedule please contact [facility] [place] directly at [phone].  Normal or non-critical lab and imaging results will be communicated to you by MyChart, letter or phone within 4 business days after the clinic has received the results. If you do not hear from us within 7 days, please contact the clinic through [employer] or phone. If you have a critical or abnormal lab result, we will notify you by phone as soon as possible.  Submit refill requests through [employer] or call your pharmacy  and they will forward the refill request to us. Please allow 3 business days for your refill to be completed.          Additional Information About Your Visit        [employer] Information     [employer] gives you secure access to your electronic health record. If you see a primary care provider, you can also send messages to your care team and make appointments. If you have questions, please call your primary care clinic.  If you do not have a primary care provider, please call [phone] and they will assist you.        Care EveryWhere ID     This is your Care EveryWhere ID. This could be used by other organizations to access your [place] medical records  [id number]        Your Vitals Were     Pulse Temperature Respirations Last Period Pulse Oximetry BMI (Body Mass Index)    46 98.2  F (36.8  C) (Oral) 16 04/14/2008 99% 36.12 kg/m2       Blood Pressure from Last 3 Encounters:   08/10/18 117/78   02/09/18 137/81   08/04/17 118/77    Weight from Last 3 Encounters:   08/10/18 99.5 kg (219 lb 6.4 oz)   02/09/18 106.3 kg (234 lb 6.4 oz)   08/04/17 105.9 kg (233 lb 6.4 oz)               Primary Care Provider Office Phone # Fax #    Chaseaidee Coyne [phone]       [facility] [address]  [facility] [address]        Equal Access to Services     [place] : Hadii radha britton hadisrealo Soeusebio, waaxda luqadaha, qaybta kaalheaven lopez. So [facility] [phone].    ATENCIÓN: Si habla español, tiene a aguilar disposición servicios gratuitos de asistencia lingüística. Lucio al [phone].    We comply with applicable federal civil rights laws and [place] laws. We do not discriminate on the basis of race, color, national origin, age, disability, sex, sexual orientation, or gender identity.            Thank you!     Thank you for choosing [facility]  for your care. Our goal is always to provide you with excellent care. Hearing back from our patients is  one way we can continue to improve our services. Please take a few minutes to complete the written survey that you may receive in the mail after your visit with us. Thank you!             Your Updated Medication List - Protect others around you: Learn how to safely use, store and throw away your medicines at www.disposemymeds.org.          This list is accurate as of 8/10/18  8:28 AM.  Always use your most recent med list.                   Brand Name Dispense Instructions for use Diagnosis    LISINOPRIL PO      Take 10 mg by mouth daily        VITAMIN D (CHOLECALCIFEROL) PO      Take 2,000 Units by mouth daily    Ductal carcinoma in situ (DCIS) of right breast

## 2019-02-19 ENCOUNTER — INFUSION THERAPY VISIT (OUTPATIENT)
Dept: INFUSION THERAPY | Facility: CLINIC | Age: 54
End: 2019-02-19
Attending: INTERNAL MEDICINE
Payer: COMMERCIAL

## 2019-02-19 ENCOUNTER — HOSPITAL ENCOUNTER (OUTPATIENT)
Facility: CLINIC | Age: 54
Setting detail: SPECIMEN
Discharge: HOME | End: 2019-02-19
Attending: INTERNAL MEDICINE | Admitting: INTERNAL MEDICINE
Payer: COMMERCIAL

## 2019-02-19 DIAGNOSIS — D05.11 DUCTAL CARCINOMA IN SITU (DCIS) OF RIGHT BREAST: Primary | ICD-10-CM

## 2019-02-19 DIAGNOSIS — D05.11 DUCTAL CARCINOMA IN SITU (DCIS) OF RIGHT BREAST: ICD-10-CM

## 2019-02-19 LAB
ALBUMIN SERPL-MCNC: 3.8 G/DL (ref 3.4–5)
ALP SERPL-CCNC: 96 U/L (ref 40–150)
ALT SERPL W P-5'-P-CCNC: 25 U/L (ref 0–50)
ANION GAP SERPL CALCULATED.3IONS-SCNC: 3 MMOL/L (ref 3–14)
AST SERPL W P-5'-P-CCNC: 13 U/L (ref 0–45)
BASOPHILS # BLD AUTO: 0.1 10E9/L (ref 0–0.2)
BASOPHILS NFR BLD AUTO: 0.7 %
BILIRUB SERPL-MCNC: 0.8 MG/DL (ref 0.2–1.3)
BUN SERPL-MCNC: 20 MG/DL (ref 7–30)
CALCIUM SERPL-MCNC: 9 MG/DL (ref 8.5–10.1)
CANCER AG27-29 SERPL-ACNC: 12 U/ML (ref 0–39)
CHLORIDE SERPL-SCNC: 110 MMOL/L (ref 94–109)
CO2 SERPL-SCNC: 29 MMOL/L (ref 20–32)
CREAT SERPL-MCNC: 0.72 MG/DL (ref 0.52–1.04)
DIFFERENTIAL METHOD BLD: NORMAL
EOSINOPHIL # BLD AUTO: 0.1 10E9/L (ref 0–0.7)
EOSINOPHIL NFR BLD AUTO: 0.8 %
ERYTHROCYTE [DISTWIDTH] IN BLOOD BY AUTOMATED COUNT: 12.1 % (ref 10–15)
GFR SERPL CREATININE-BSD FRML MDRD: >90 ML/MIN/{1.73_M2}
GLUCOSE SERPL-MCNC: 89 MG/DL (ref 70–99)
HCT VFR BLD AUTO: 41.8 % (ref 35–47)
HGB BLD-MCNC: 14.8 G/DL (ref 11.7–15.7)
IMM GRANULOCYTES # BLD: 0 10E9/L (ref 0–0.4)
IMM GRANULOCYTES NFR BLD: 0.3 %
LYMPHOCYTES # BLD AUTO: 2.8 10E9/L (ref 0.8–5.3)
LYMPHOCYTES NFR BLD AUTO: 38.4 %
MCH RBC QN AUTO: 31.8 PG (ref 26.5–33)
MCHC RBC AUTO-ENTMCNC: 35.4 G/DL (ref 31.5–36.5)
MCV RBC AUTO: 90 FL (ref 78–100)
MONOCYTES # BLD AUTO: 0.5 10E9/L (ref 0–1.3)
MONOCYTES NFR BLD AUTO: 6.4 %
NEUTROPHILS # BLD AUTO: 3.8 10E9/L (ref 1.6–8.3)
NEUTROPHILS NFR BLD AUTO: 53.4 %
NRBC # BLD AUTO: 0 10*3/UL
NRBC BLD AUTO-RTO: 0 /100
PLATELET # BLD AUTO: 292 10E9/L (ref 150–450)
POTASSIUM SERPL-SCNC: 3.8 MMOL/L (ref 3.4–5.3)
PROT SERPL-MCNC: 7.6 G/DL (ref 6.8–8.8)
RBC # BLD AUTO: 4.66 10E12/L (ref 3.8–5.2)
SODIUM SERPL-SCNC: 142 MMOL/L (ref 133–144)
WBC # BLD AUTO: 7.2 10E9/L (ref 4–11)

## 2019-02-19 PROCEDURE — 85025 COMPLETE CBC W/AUTO DIFF WBC: CPT | Performed by: INTERNAL MEDICINE

## 2019-02-19 PROCEDURE — 86300 IMMUNOASSAY TUMOR CA 15-3: CPT | Performed by: INTERNAL MEDICINE

## 2019-02-19 PROCEDURE — 36415 COLL VENOUS BLD VENIPUNCTURE: CPT

## 2019-02-19 PROCEDURE — 80053 COMPREHEN METABOLIC PANEL: CPT | Performed by: INTERNAL MEDICINE

## 2019-02-19 NOTE — PROGRESS NOTES
Medical Assistant Note:  Kimberly Elizabethkailee Miller presents today for blood draw.    Patient seen by provider today: No.   present during visit today: Not Applicable.    Concerns: No Concerns.    Procedure:  Lab draw site: Left hand, Needle type: BF, Gauge: 23g with gauze and coban.    Post Assessment:  Labs drawn without difficulty: Yes.    Discharge Plan:  Departure Mode: Ambulatory.    Face to Face Time: 5.    Kaci Montero, CMA

## 2019-02-26 ENCOUNTER — HOSPITAL ENCOUNTER (OUTPATIENT)
Facility: CLINIC | Age: 54
Setting detail: SPECIMEN
End: 2019-02-26
Attending: INTERNAL MEDICINE
Payer: COMMERCIAL

## 2019-02-26 ENCOUNTER — ONCOLOGY VISIT (OUTPATIENT)
Dept: ONCOLOGY | Facility: CLINIC | Age: 54
End: 2019-02-26
Attending: INTERNAL MEDICINE
Payer: COMMERCIAL

## 2019-02-26 VITALS
SYSTOLIC BLOOD PRESSURE: 114 MMHG | RESPIRATION RATE: 16 BRPM | OXYGEN SATURATION: 99 % | BODY MASS INDEX: 37.15 KG/M2 | WEIGHT: 223 LBS | DIASTOLIC BLOOD PRESSURE: 67 MMHG | HEART RATE: 55 BPM | TEMPERATURE: 97.8 F | HEIGHT: 65 IN

## 2019-02-26 DIAGNOSIS — D05.11 DUCTAL CARCINOMA IN SITU (DCIS) OF RIGHT BREAST: Primary | ICD-10-CM

## 2019-02-26 PROCEDURE — G0463 HOSPITAL OUTPT CLINIC VISIT: HCPCS

## 2019-02-26 PROCEDURE — 99214 OFFICE O/P EST MOD 30 MIN: CPT | Performed by: INTERNAL MEDICINE

## 2019-02-26 ASSESSMENT — MIFFLIN-ST. JEOR: SCORE: 1617.95

## 2019-02-26 ASSESSMENT — PAIN SCALES - GENERAL: PAINLEVEL: NO PAIN (0)

## 2019-02-26 NOTE — PROGRESS NOTES
"Oncology Rooming Note    February 26, 2019 8:34 AM   Kimberly Miller is a 54 year old female who presents for:    Chief Complaint   Patient presents with     Oncology Clinic Visit     Ductal carcinoma in situ (DCIS) of right breast     Initial Vitals: /67 (BP Location: Right arm, Cuff Size: Adult Large)   Pulse 55   Temp 97.8  F (36.6  C) (Oral)   Resp 16   Ht 1.66 m (5' 5.35\")   Wt 101.2 kg (223 lb)   LMP 04/14/2008   SpO2 99%   BMI 36.71 kg/m   Estimated body mass index is 36.71 kg/m  as calculated from the following:    Height as of this encounter: 1.66 m (5' 5.35\").    Weight as of this encounter: 101.2 kg (223 lb). Body surface area is 2.16 meters squared.  No Pain (0) Comment: Data Unavailable   Patient's last menstrual period was 04/14/2008.  Allergies reviewed: Yes  Medications reviewed: Yes    Medications: Medication refills not needed today.  Pharmacy name entered into [facility]:    [facility] [place] [facility] [place] [address] [facility] [address]    Clinical concerns: none      Petrona Lane CMA              "

## 2019-02-26 NOTE — LETTER
2/26/2019         RE: Kimberly Miller  [address]  [place] [address]        Dear Colleague,    Thank you for referring your patient, Kimberly Miller, to the [facility]. Please see a copy of my visit note below.    [facility]    Hematology/Oncology Established Patient Note      Today's Date: 02/26/2019    Reason for Follow-up: BRCA 2 mutation and DCIS      HISTORY OF PRESENT ILLNESS: Kimberly Miller is a 54 year old female with history of HTN, who presents with DCIS.  She has significant family history of breast and ovarian cancer.  She is BRCA2 positive, and has been followed closely previously by Dr. Reed for high-risk.  On a surveillance MRI breast on 12/8/15, there was abnormal finding in the right breast, and ultimately was found to have DCIS.  On 1/28/16, she underwent bilateral mastectomy by Dr. Alexander at [facility], with pathology showing DCIS of the right breast, grade 2 with occasional nuclear grade 3 cells, size 1.0 x 0.5 cm, negative for invasive carcinoma, 0/1 lymph node with carcinoma, ER positive at 75%, and WI positive at 20%.  She has undergone reconstruction.      She has history of SUMAYA-BSO in April 2008.  She notes that she still gets hot flashes occasionally.        INTERIM HISTORY: Kimberly comes in for follow-up today.  She says that she has been doing well.  She denies any new complaints today. She says that she is getting a follow-up ultrasound with Dr. Valente coming up soon because she had followed up with him previously for a bump on her implant.  She says that she was reassured that it was due to the implant and fat grafting.  She says that it hasn't changed and she's not too worried about it.        REVIEW OF SYSTEMS:   14 point ROS was reviewed and is negative other than as noted above in HPI.       HOME MEDICATIONS:  Current Outpatient Medications   Medication Sig Dispense Refill     LISINOPRIL PO Take 10 mg  by mouth daily       VITAMIN D, CHOLECALCIFEROL, PO Take 2,000 Units by mouth daily           ALLERGIES:  Allergies   Allergen Reactions     Penicillin [Penicillins] Rash         PAST MEDICAL HISTORY:  Past Medical History:   Diagnosis Date     Family history of breast cancer      Family history of ovarian cancer      Hypertension          PAST SURGICAL HISTORY:  Past Surgical History:   Procedure Laterality Date     BIOPSY BREAST Right 12/18/2015    MRI Biopsy     COLONOSCOPY  10/1/2012    Procedure: COLONOSCOPY;  Colonoscopy;  Surgeon: Karma Oates MD;  Location: [facility]     HC BIOPSY BREAST, PERC NEEDLE CORE, NO IMAGING Right 1/9/2008     - Benign     HC REMOVAL GALLBLADDER  1993         SOCIAL HISTORY:  Social History     Socioeconomic History     Marital status:      Spouse name: Not on file     Number of children: Not on file     Years of education: Not on file     Highest education level: Not on file   Occupational History     Not on file   Social Needs     Financial resource strain: Not on file     Food insecurity:     Worry: Not on file     Inability: Not on file     Transportation needs:     Medical: Not on file     Non-medical: Not on file   Tobacco Use     Smoking status: Never Smoker     Smokeless tobacco: Never Used   Substance and Sexual Activity     Alcohol use: Yes     Comment: rarely     Drug use: No     Sexual activity: Yes     Partners: Male   Lifestyle     Physical activity:     Days per week: Not on file     Minutes per session: Not on file     Stress: Not on file   Relationships     Social connections:     Talks on phone: Not on file     Gets together: Not on file     Attends Jain service: Not on file     Active member of club or organization: Not on file     Attends meetings of clubs or organizations: Not on file     Relationship status: Not on file     Intimate partner violence:     Fear of current or ex partner: Not on file     Emotionally abused: Not on file      "Physically abused: Not on file     Forced sexual activity: Not on file   Other Topics Concern     Parent/sibling w/ CABG, MI or angioplasty before 65F 55M? No   Social History Narrative     Not on file         FAMILY HISTORY:  Family History   Problem Relation Age of Onset     C.A.D. Mother         long QT syndrome     Genetic Disorder Father         + BRCA 2; no cancer     Cancer Father 81        CMML     Cancer Sister         breast- + BRCA 2     Cancer Brother         lung diagnosed age 36     Cancer Maternal Grandmother         ovarian     Cancer Maternal Grandfather         lung     Cancer Other         distant relative with breast cancer; male     Cancer Other         distant relative with breast cancer; female     Cancer Other         distant relative with breast cancer; female         PHYSICAL EXAM:  Vital signs:  /67 (BP Location: Right arm, Cuff Size: Adult Large)   Pulse 55   Temp 97.8  F (36.6  C) (Oral)   Resp 16   Ht 1.66 m (5' 5.35\")   Wt 101.2 kg (223 lb)   LMP 2008   SpO2 99%   BMI 36.71 kg/m      ECO  GENERAL/CONSTITUTIONAL: No acute distress.  EYES: No scleral icterus.  LYMPH: No anterior cervical, posterior cervical, supraclavicular, or axillary adenopathy.   RESPIRATORY: Clear to auscultation bilaterally. No crackles or wheezing.   CARDIOVASCULAR: Regular rate and rhythm without murmurs, gallops, or rubs.  GASTROINTESTINAL: No tenderness. The patient has normal bowel sounds. No guarding.  No distention.  BREAST: s/p bilateral mastectomies with reconstruction.  MUSCULOSKELETAL: Warm and well-perfused.  NEUROLOGIC: Alert, oriented, answers questions appropriately.  INTEGUMENTARY: No rashes or jaundice.  GAIT: Steady, does not use assistive device      LABS:  CBC RESULTS:   Recent Labs   Lab Test 19  0835   WBC 7.2   RBC 4.66   HGB 14.8   HCT 41.8   MCV 90   MCH 31.8   MCHC 35.4   RDW 12.1        Recent Labs   Lab Test 19  0835 18  0815    140 "   POTASSIUM 3.8 4.9   CHLORIDE 110* 104   CO2 29 34*   ANIONGAP 3 2*   GLC 89 79   BUN 20 18   CR 0.72 0.72   MILY 9.0 9.0     Lab Results   Component Value Date    AST 13 02/19/2019     Lab Results   Component Value Date    ALT 25 02/19/2019     No results found for: BILICONJ   Lab Results   Component Value Date    BILITOTAL 0.8 02/19/2019     Lab Results   Component Value Date    ALBUMIN 3.8 02/19/2019     Lab Results   Component Value Date    PROTTOTAL 7.6 02/19/2019      Lab Results   Component Value Date    ALKPHOS 96 02/19/2019     Component      Latest Ref Rng & Units 7/31/2017 2/6/2018 8/7/2018 2/19/2019   CA 27-29      0 - 39 U/mL 9 10 13 12         ASSESSMENT/PLAN:  Kimberly Miller is a 54 year old post-menopausal female with:    1) DCIS of the right breast: ER positive, NC positive.  +BRCA2 mutation.  She is s/p bilateral mastectomy with reconstruction.  She has also had SUMAYA-BSO in 2008.  We previously discussed chemoprevention with tamoxifen or AI, weighing the benefits and risks/toxicities.  Ultimately, as she has had a risk-reducing bilateral mastectomy, she decided not to start hormonal therapy, and opting for surveillance with labs and tumor markers.  Will obtain imaging only if symptoms arise.    She has had no evidence of recurrence on exam.  Recent labs reviewed - unremarkable.  She would like to continue routine labs and tumor marker checks.  -RTC in 6 months with labs: CBC, CMP, CA 27-29    2) Breast reconstruction: She has fat grafting surgery in November 2017 and now doing much better.  She continues to do stretching exercises for lymphedema/scarring.  -follows with Dr. Valente        I spent a total of 25 minutes with the patient, with over >50% of the time in counseling and/or coordination of care.      Becky Matta MD  Hematology/Oncology  [facility]        Oncology Rooming Note    February 26, 2019 8:34 AM   Kimberly Miller is a 54 year old female  "who presents for:    Chief Complaint   Patient presents with     Oncology Clinic Visit     Ductal carcinoma in situ (DCIS) of right breast     Initial Vitals: /67 (BP Location: Right arm, Cuff Size: Adult Large)   Pulse 55   Temp 97.8  F (36.6  C) (Oral)   Resp 16   Ht 1.66 m (5' 5.35\")   Wt 101.2 kg (223 lb)   LMP 04/14/2008   SpO2 99%   BMI 36.71 kg/m    Estimated body mass index is 36.71 kg/m  as calculated from the following:    Height as of this encounter: 1.66 m (5' 5.35\").    Weight as of this encounter: 101.2 kg (223 lb). Body surface area is 2.16 meters squared.  No Pain (0) Comment: Data Unavailable   Patient's last menstrual period was 04/14/2008.  Allergies reviewed: Yes  Medications reviewed: Yes    Medications: Medication refills not needed today.  Pharmacy name entered into [employer]:    [facility] [address] [facility] [address]    Clinical concerns: none      Petrona Lane CMA                Again, thank you for allowing me to participate in the care of your patient.        Sincerely,        Becky Matta MD    "

## 2019-02-26 NOTE — PROGRESS NOTES
[facility]    Hematology/Oncology Established Patient Note      Today's Date: 02/26/2019    Reason for Follow-up: BRCA 2 mutation and DCIS      HISTORY OF PRESENT ILLNESS: Kimberly Miller is a 54 year old female with history of HTN, who presents with DCIS.  She has significant family history of breast and ovarian cancer.  She is BRCA2 positive, and has been followed closely previously by Dr. Reed for high-risk.  On a surveillance MRI breast on 12/8/15, there was abnormal finding in the right breast, and ultimately was found to have DCIS.  On 1/28/16, she underwent bilateral mastectomy by Dr. Alexander at [facility], with pathology showing DCIS of the right breast, grade 2 with occasional nuclear grade 3 cells, size 1.0 x 0.5 cm, negative for invasive carcinoma, 0/1 lymph node with carcinoma, ER positive at 75%, and NJ positive at 20%.  She has undergone reconstruction.      She has history of SUMAYA-BSO in April 2008.  She notes that she still gets hot flashes occasionally.        INTERIM HISTORY: Kimberly comes in for follow-up today.  She says that she has been doing well.  She denies any new complaints today. She says that she is getting a follow-up ultrasound with Dr. Valente coming up soon because she had followed up with him previously for a bump on her implant.  She says that she was reassured that it was due to the implant and fat grafting.  She says that it hasn't changed and she's not too worried about it.        REVIEW OF SYSTEMS:   14 point ROS was reviewed and is negative other than as noted above in HPI.       HOME MEDICATIONS:  Current Outpatient Medications   Medication Sig Dispense Refill     LISINOPRIL PO Take 10 mg by mouth daily       VITAMIN D, CHOLECALCIFEROL, PO Take 2,000 Units by mouth daily           ALLERGIES:  Allergies   Allergen Reactions     Penicillin [Penicillins] Rash         PAST MEDICAL HISTORY:  Past Medical History:   Diagnosis Date     Family  history of breast cancer      Family history of ovarian cancer      Hypertension          PAST SURGICAL HISTORY:  Past Surgical History:   Procedure Laterality Date     BIOPSY BREAST Right 12/18/2015    MRI Biopsy     COLONOSCOPY  10/1/2012    Procedure: COLONOSCOPY;  Colonoscopy;  Surgeon: Karma Oates MD;  Location: [facility]     HC BIOPSY BREAST, PERC NEEDLE CORE, NO IMAGING Right 1/9/2008     - Benign     HC REMOVAL GALLBLADDER  1993         SOCIAL HISTORY:  Social History     Socioeconomic History     Marital status:      Spouse name: Not on file     Number of children: Not on file     Years of education: Not on file     Highest education level: Not on file   Occupational History     Not on file   Social Needs     Financial resource strain: Not on file     Food insecurity:     Worry: Not on file     Inability: Not on file     Transportation needs:     Medical: Not on file     Non-medical: Not on file   Tobacco Use     Smoking status: Never Smoker     Smokeless tobacco: Never Used   Substance and Sexual Activity     Alcohol use: Yes     Comment: rarely     Drug use: No     Sexual activity: Yes     Partners: Male   Lifestyle     Physical activity:     Days per week: Not on file     Minutes per session: Not on file     Stress: Not on file   Relationships     Social connections:     Talks on phone: Not on file     Gets together: Not on file     Attends Hinduism service: Not on file     Active member of club or organization: Not on file     Attends meetings of clubs or organizations: Not on file     Relationship status: Not on file     Intimate partner violence:     Fear of current or ex partner: Not on file     Emotionally abused: Not on file     Physically abused: Not on file     Forced sexual activity: Not on file   Other Topics Concern     Parent/sibling w/ CABG, MI or angioplasty before 65F 55M? No   Social History Narrative     Not on file         FAMILY HISTORY:  Family History   Problem Relation  "Age of Onset     C.A.D. Mother         long QT syndrome     Genetic Disorder Father         + BRCA 2; no cancer     Cancer Father 81        CMML     Cancer Sister         breast- + BRCA 2     Cancer Brother         lung diagnosed age 36     Cancer Maternal Grandmother         ovarian     Cancer Maternal Grandfather         lung     Cancer Other         distant relative with breast cancer; male     Cancer Other         distant relative with breast cancer; female     Cancer Other         distant relative with breast cancer; female         PHYSICAL EXAM:  Vital signs:  /67 (BP Location: Right arm, Cuff Size: Adult Large)   Pulse 55   Temp 97.8  F (36.6  C) (Oral)   Resp 16   Ht 1.66 m (5' 5.35\")   Wt 101.2 kg (223 lb)   LMP 2008   SpO2 99%   BMI 36.71 kg/m     ECO  GENERAL/CONSTITUTIONAL: No acute distress.  EYES: No scleral icterus.  LYMPH: No anterior cervical, posterior cervical, supraclavicular, or axillary adenopathy.   RESPIRATORY: Clear to auscultation bilaterally. No crackles or wheezing.   CARDIOVASCULAR: Regular rate and rhythm without murmurs, gallops, or rubs.  GASTROINTESTINAL: No tenderness. The patient has normal bowel sounds. No guarding.  No distention.  BREAST: s/p bilateral mastectomies with reconstruction.  MUSCULOSKELETAL: Warm and well-perfused.  NEUROLOGIC: Alert, oriented, answers questions appropriately.  INTEGUMENTARY: No rashes or jaundice.  GAIT: Steady, does not use assistive device      LABS:  CBC RESULTS:   Recent Labs   Lab Test 19  0835   WBC 7.2   RBC 4.66   HGB 14.8   HCT 41.8   MCV 90   MCH 31.8   MCHC 35.4   RDW 12.1        Recent Labs   Lab Test 19  0835 18  0815    140   POTASSIUM 3.8 4.9   CHLORIDE 110* 104   CO2 29 34*   ANIONGAP 3 2*   GLC 89 79   BUN 20 18   CR 0.72 0.72   MILY 9.0 9.0     Lab Results   Component Value Date    AST 13 2019     Lab Results   Component Value Date    ALT 2019     No results found " for: BILICONJ   Lab Results   Component Value Date    BILITOTAL 0.8 02/19/2019     Lab Results   Component Value Date    ALBUMIN 3.8 02/19/2019     Lab Results   Component Value Date    PROTTOTAL 7.6 02/19/2019      Lab Results   Component Value Date    ALKPHOS 96 02/19/2019     Component      Latest Ref Rng & Units 7/31/2017 2/6/2018 8/7/2018 2/19/2019   CA 27-29      0 - 39 U/mL 9 10 13 12         ASSESSMENT/PLAN:  Kimberly Miller is a 54 year old post-menopausal female with:    1) DCIS of the right breast: ER positive, TX positive.  +BRCA2 mutation.  She is s/p bilateral mastectomy with reconstruction.  She has also had SUMAYA-BSO in 2008.  We previously discussed chemoprevention with tamoxifen or AI, weighing the benefits and risks/toxicities.  Ultimately, as she has had a risk-reducing bilateral mastectomy, she decided not to start hormonal therapy, and opting for surveillance with labs and tumor markers.  Will obtain imaging only if symptoms arise.    She has had no evidence of recurrence on exam.  Recent labs reviewed - unremarkable.  She would like to continue routine labs and tumor marker checks.  -RTC in 6 months with labs: CBC, CMP, CA 27-29    2) Breast reconstruction: She has fat grafting surgery in November 2017 and now doing much better.  She continues to do stretching exercises for lymphedema/scarring.  -follows with Dr. Valente        I spent a total of 25 minutes with the patient, with over >50% of the time in counseling and/or coordination of care.      Becky Matta MD  Hematology/Oncology  [facility]

## 2020-02-27 ENCOUNTER — INFUSION THERAPY VISIT (OUTPATIENT)
Dept: INFUSION THERAPY | Facility: CLINIC | Age: 55
End: 2020-02-27
Attending: INTERNAL MEDICINE
Payer: COMMERCIAL

## 2020-02-27 ENCOUNTER — HOSPITAL ENCOUNTER (OUTPATIENT)
Facility: CLINIC | Age: 55
Setting detail: SPECIMEN
Discharge: HOME | End: 2020-02-27
Attending: INTERNAL MEDICINE | Admitting: INTERNAL MEDICINE
Payer: COMMERCIAL

## 2020-02-27 DIAGNOSIS — D05.11 DUCTAL CARCINOMA IN SITU (DCIS) OF RIGHT BREAST: ICD-10-CM

## 2020-02-27 LAB
ALBUMIN SERPL-MCNC: 3.9 G/DL (ref 3.4–5)
ALP SERPL-CCNC: 99 U/L (ref 40–150)
ALT SERPL W P-5'-P-CCNC: 31 U/L (ref 0–50)
ANION GAP SERPL CALCULATED.3IONS-SCNC: 4 MMOL/L (ref 3–14)
AST SERPL W P-5'-P-CCNC: 14 U/L (ref 0–45)
BASOPHILS # BLD AUTO: 0 10E9/L (ref 0–0.2)
BASOPHILS NFR BLD AUTO: 0.4 %
BILIRUB SERPL-MCNC: 1.1 MG/DL (ref 0.2–1.3)
BUN SERPL-MCNC: 15 MG/DL (ref 7–30)
CALCIUM SERPL-MCNC: 9.2 MG/DL (ref 8.5–10.1)
CANCER AG27-29 SERPL-ACNC: 14 U/ML (ref 0–39)
CHLORIDE SERPL-SCNC: 107 MMOL/L (ref 94–109)
CO2 SERPL-SCNC: 27 MMOL/L (ref 20–32)
CREAT SERPL-MCNC: 0.72 MG/DL (ref 0.52–1.04)
DIFFERENTIAL METHOD BLD: NORMAL
EOSINOPHIL # BLD AUTO: 0.1 10E9/L (ref 0–0.7)
EOSINOPHIL NFR BLD AUTO: 1 %
ERYTHROCYTE [DISTWIDTH] IN BLOOD BY AUTOMATED COUNT: 12.4 % (ref 10–15)
GFR SERPL CREATININE-BSD FRML MDRD: >90 ML/MIN/{1.73_M2}
GLUCOSE SERPL-MCNC: 72 MG/DL (ref 70–99)
HCT VFR BLD AUTO: 45.3 % (ref 35–47)
HGB BLD-MCNC: 15.5 G/DL (ref 11.7–15.7)
IMM GRANULOCYTES # BLD: 0 10E9/L (ref 0–0.4)
IMM GRANULOCYTES NFR BLD: 0.1 %
LYMPHOCYTES # BLD AUTO: 2.8 10E9/L (ref 0.8–5.3)
LYMPHOCYTES NFR BLD AUTO: 39.9 %
MCH RBC QN AUTO: 31.1 PG (ref 26.5–33)
MCHC RBC AUTO-ENTMCNC: 34.2 G/DL (ref 31.5–36.5)
MCV RBC AUTO: 91 FL (ref 78–100)
MONOCYTES # BLD AUTO: 0.4 10E9/L (ref 0–1.3)
MONOCYTES NFR BLD AUTO: 6 %
NEUTROPHILS # BLD AUTO: 3.7 10E9/L (ref 1.6–8.3)
NEUTROPHILS NFR BLD AUTO: 52.6 %
NRBC # BLD AUTO: 0 10*3/UL
NRBC BLD AUTO-RTO: 0 /100
PLATELET # BLD AUTO: 297 10E9/L (ref 150–450)
POTASSIUM SERPL-SCNC: 3.8 MMOL/L (ref 3.4–5.3)
PROT SERPL-MCNC: 8.1 G/DL (ref 6.8–8.8)
RBC # BLD AUTO: 4.99 10E12/L (ref 3.8–5.2)
SODIUM SERPL-SCNC: 138 MMOL/L (ref 133–144)
WBC # BLD AUTO: 7.1 10E9/L (ref 4–11)

## 2020-02-27 PROCEDURE — 86300 IMMUNOASSAY TUMOR CA 15-3: CPT | Performed by: INTERNAL MEDICINE

## 2020-02-27 PROCEDURE — 36415 COLL VENOUS BLD VENIPUNCTURE: CPT

## 2020-02-27 PROCEDURE — 85025 COMPLETE CBC W/AUTO DIFF WBC: CPT | Performed by: INTERNAL MEDICINE

## 2020-02-27 PROCEDURE — 80053 COMPREHEN METABOLIC PANEL: CPT | Performed by: INTERNAL MEDICINE

## 2020-02-27 NOTE — PROGRESS NOTES
Medical Assistant Note:  Kimberly Miller presents today for lab draw.    Patient seen by provider today: No.   present during visit today: Not Applicable.    Concerns: No Concerns.    Procedure:  Lab draw site: LAC, Needle type: Butterfly, Gauge: 23.    Post Assessment:  Labs drawn without difficulty: Yes.    Discharge Plan:  Departure Mode: Ambulatory.    Face to Face Time: 4 min.    Shari Schoenberger, CMA

## 2020-03-24 ENCOUNTER — VIRTUAL VISIT (OUTPATIENT)
Dept: ONCOLOGY | Facility: CLINIC | Age: 55
End: 2020-03-24
Attending: INTERNAL MEDICINE
Payer: COMMERCIAL

## 2020-03-24 DIAGNOSIS — D05.11 DUCTAL CARCINOMA IN SITU (DCIS) OF RIGHT BREAST: Primary | ICD-10-CM

## 2020-03-24 PROCEDURE — 99213 OFFICE O/P EST LOW 20 MIN: CPT | Mod: TEL | Performed by: INTERNAL MEDICINE

## 2020-03-24 NOTE — PROGRESS NOTES
"Kimberly Miller is a 55 year old female who is being evaluated via a billable telephone visit.      The patient has been notified of following:     \"This telephone visit will be conducted via a call between you and your physician/provider. We have found that certain health care needs can be provided without the need for a physical exam.  This service lets us provide the care you need with a short phone conversation.  If a prescription is necessary we can send it directly to your pharmacy.  If lab work is needed we can place an order for that and you can then stop by our lab to have the test done at a later time.    If during the course of the call the physician/provider feels a telephone visit is not appropriate, you will not be charged for this service.\"     Kimberly Miller complains of    Chief Complaint   Patient presents with     Oncology Clinic Visit     Ductal carcinoma in situ        I have reviewed and updated the patient's Past Medical History, Social History, Family History and Medication List.    ALLERGIES  Penicillin [penicillins]  Darleen Carrillo CMA on 3/24/2020 at 8:27 AM          [facility]    Hematology/Oncology Established Patient Note      Today's Date: 03/24/2020    Reason for Follow-up: BRCA 2 mutation and DCIS      HISTORY OF PRESENT ILLNESS: Kimberly Miller is a 55 year old female with history of HTN, who presents with DCIS.  She has significant family history of breast and ovarian cancer.  She is BRCA2 positive, and has been followed closely previously by Dr. Reed for high-risk.  On a surveillance MRI breast on 12/8/15, there was abnormal finding in the right breast, and ultimately was found to have DCIS.  On 1/28/16, she underwent bilateral mastectomy by Dr. Alexander at [facility], with pathology showing DCIS of the right breast, grade 2 with occasional nuclear grade 3 cells, size 1.0 x 0.5 cm, negative for invasive carcinoma, 0/1 lymph " node with carcinoma, ER positive at 75%, and AL positive at 20%.  She has undergone reconstruction.      She has history of SUMAYA-BSO in April 2008.  She notes that she still gets hot flashes occasionally.        INTERIM HISTORY: Kimberly says that she is doing well.  She denies any new complaints today.  She denies any new lumps/bumps or new pains.  She saw Dr. Valente about a month ago and felt that she was doing well.      REVIEW OF SYSTEMS:   14 point ROS was reviewed and is negative other than as noted above in HPI.       HOME MEDICATIONS:  Current Outpatient Medications   Medication Sig Dispense Refill     LISINOPRIL PO Take 10 mg by mouth daily       VITAMIN D, CHOLECALCIFEROL, PO Take 2,000 Units by mouth daily           ALLERGIES:  Allergies   Allergen Reactions     Penicillin [Penicillins] Rash         PAST MEDICAL HISTORY:  Past Medical History:   Diagnosis Date     Family history of breast cancer      Family history of ovarian cancer      Hypertension          PAST SURGICAL HISTORY:  Past Surgical History:   Procedure Laterality Date     BIOPSY BREAST Right 12/18/2015    MRI Biopsy     COLONOSCOPY  10/1/2012    Procedure: COLONOSCOPY;  Colonoscopy;  Surgeon: Karma Oates MD;  Location: [facility]     HC BIOPSY BREAST, PERC NEEDLE CORE, NO IMAGING Right 1/9/2008     - Benign     HC REMOVAL GALLBLADDER  1993         SOCIAL HISTORY:  Social History     Socioeconomic History     Marital status:      Spouse name: Not on file     Number of children: Not on file     Years of education: Not on file     Highest education level: Not on file   Occupational History     Not on file   Social Needs     Financial resource strain: Not on file     Food insecurity     Worry: Not on file     Inability: Not on file     Transportation needs     Medical: Not on file     Non-medical: Not on file   Tobacco Use     Smoking status: Never Smoker     Smokeless tobacco: Never Used   Substance and Sexual Activity     Alcohol  use: Yes     Comment: rarely     Drug use: No     Sexual activity: Yes     Partners: Male   Lifestyle     Physical activity     Days per week: Not on file     Minutes per session: Not on file     Stress: Not on file   Relationships     Social connections     Talks on phone: Not on file     Gets together: Not on file     Attends Scientology service: Not on file     Active member of club or organization: Not on file     Attends meetings of clubs or organizations: Not on file     Relationship status: Not on file     Intimate partner violence     Fear of current or ex partner: Not on file     Emotionally abused: Not on file     Physically abused: Not on file     Forced sexual activity: Not on file   Other Topics Concern     Parent/sibling w/ CABG, MI or angioplasty before 65F 55M? No   Social History Narrative     Not on file         FAMILY HISTORY:  Family History   Problem Relation Age of Onset     C.A.D. Mother         long QT syndrome     Genetic Disorder Father         + BRCA 2; no cancer     Cancer Father 81        CMML     Cancer Sister         breast- + BRCA 2     Cancer Brother         lung diagnosed age 36     Cancer Maternal Grandmother         ovarian     Cancer Maternal Grandfather         lung     Cancer Other         distant relative with breast cancer; male     Cancer Other         distant relative with breast cancer; female     Cancer Other         distant relative with breast cancer; female         PHYSICAL EXAM:  Vital signs:  LMP 04/14/2008    Phone visit.  Exam not done.      LABS:  CBC RESULTS:   Recent Labs   Lab Test 02/27/20  0824   WBC 7.1   RBC 4.99   HGB 15.5   HCT 45.3   MCV 91   MCH 31.1   MCHC 34.2   RDW 12.4        Recent Labs   Lab Test 02/27/20  0824 02/19/19  0835    142   POTASSIUM 3.8 3.8   CHLORIDE 107 110*   CO2 27 29   ANIONGAP 4 3   GLC 72 89   BUN 15 20   CR 0.72 0.72   MILY 9.2 9.0     Lab Results   Component Value Date    AST 14 02/27/2020     Lab Results   Component  Value Date    ALT 31 02/27/2020     No results found for: BILICONJ   Lab Results   Component Value Date    BILITOTAL 1.1 02/27/2020     Lab Results   Component Value Date    ALBUMIN 3.9 02/27/2020     Lab Results   Component Value Date    PROTTOTAL 8.1 02/27/2020      Lab Results   Component Value Date    ALKPHOS 99 02/27/2020     Component      Latest Ref Rng & Units 7/31/2017 2/6/2018 8/7/2018 2/19/2019   CA 27-29      0 - 39 U/mL 9 10 13 12     Component      Latest Ref Rng & Units 2/27/2020   CA 27-29      0 - 39 U/mL 14       ASSESSMENT/PLAN:  Kimberly Miller is a 55 year old post-menopausal female with:    1) DCIS of the right breast: ER positive, CO positive.  +BRCA2 mutation.  She is s/p bilateral mastectomy with reconstruction.  She has also had SUMAYA-BSO in 2008.  We previously discussed chemoprevention with tamoxifen or AI, weighing the benefits and risks/toxicities.  Ultimately, as she has had a risk-reducing bilateral mastectomy, she decided not to start hormonal therapy, and opting for surveillance with labs and tumor markers.  Will obtain imaging only if symptoms arise.    Recent labs reviewed - unremarkable.  She would like to continue routine labs and tumor marker checks.    Since this was a phone visit today due to current COVID pandemic, exam was not done.  I offered scheduling in-person appointment in 3 months to do exam.  However, since patient is doing well, she says that she is comfortable coming back in 6 months for follow-up and exam.    -RTC in 6 months with labs: CBC, CMP, CA 27-29    2) Breast reconstruction: She has fat grafting surgery in November 2017 and now doing much better.  She continues to do stretching exercises for lymphedema/scarring.  -follows with Dr. Ambrosio Matta MD  Hematology/Oncology  [facility]      Phone call duration: 5 minutes

## 2020-09-18 ENCOUNTER — HOSPITAL ENCOUNTER (OUTPATIENT)
Facility: CLINIC | Age: 55
Setting detail: SPECIMEN
Discharge: HOME | End: 2020-09-18
Attending: INTERNAL MEDICINE | Admitting: INTERNAL MEDICINE
Payer: COMMERCIAL

## 2020-09-18 DIAGNOSIS — D05.11 DUCTAL CARCINOMA IN SITU (DCIS) OF RIGHT BREAST: ICD-10-CM

## 2020-09-18 LAB
ALBUMIN SERPL-MCNC: 3.7 G/DL (ref 3.4–5)
ALP SERPL-CCNC: 96 U/L (ref 40–150)
ALT SERPL W P-5'-P-CCNC: 28 U/L (ref 0–50)
ANION GAP SERPL CALCULATED.3IONS-SCNC: 5 MMOL/L (ref 3–14)
AST SERPL W P-5'-P-CCNC: 14 U/L (ref 0–45)
BASOPHILS # BLD AUTO: 0.1 10E9/L (ref 0–0.2)
BASOPHILS NFR BLD AUTO: 0.6 %
BILIRUB SERPL-MCNC: 0.8 MG/DL (ref 0.2–1.3)
BUN SERPL-MCNC: 20 MG/DL (ref 7–30)
CALCIUM SERPL-MCNC: 9 MG/DL (ref 8.5–10.1)
CANCER AG27-29 SERPL-ACNC: 8 U/ML (ref 0–39)
CHLORIDE SERPL-SCNC: 106 MMOL/L (ref 94–109)
CO2 SERPL-SCNC: 29 MMOL/L (ref 20–32)
CREAT SERPL-MCNC: 0.85 MG/DL (ref 0.52–1.04)
DIFFERENTIAL METHOD BLD: NORMAL
EOSINOPHIL # BLD AUTO: 0.1 10E9/L (ref 0–0.7)
EOSINOPHIL NFR BLD AUTO: 0.8 %
ERYTHROCYTE [DISTWIDTH] IN BLOOD BY AUTOMATED COUNT: 11.8 % (ref 10–15)
GFR SERPL CREATININE-BSD FRML MDRD: 77 ML/MIN/{1.73_M2}
GLUCOSE SERPL-MCNC: 117 MG/DL (ref 70–99)
HCT VFR BLD AUTO: 43.2 % (ref 35–47)
HGB BLD-MCNC: 14.3 G/DL (ref 11.7–15.7)
IMM GRANULOCYTES # BLD: 0.1 10E9/L (ref 0–0.4)
IMM GRANULOCYTES NFR BLD: 0.5 %
LYMPHOCYTES # BLD AUTO: 3.3 10E9/L (ref 0.8–5.3)
LYMPHOCYTES NFR BLD AUTO: 34.5 %
MCH RBC QN AUTO: 31 PG (ref 26.5–33)
MCHC RBC AUTO-ENTMCNC: 33.1 G/DL (ref 31.5–36.5)
MCV RBC AUTO: 94 FL (ref 78–100)
MONOCYTES # BLD AUTO: 0.7 10E9/L (ref 0–1.3)
MONOCYTES NFR BLD AUTO: 6.9 %
NEUTROPHILS # BLD AUTO: 5.5 10E9/L (ref 1.6–8.3)
NEUTROPHILS NFR BLD AUTO: 56.7 %
NRBC # BLD AUTO: 0 10*3/UL
NRBC BLD AUTO-RTO: 0 /100
PLATELET # BLD AUTO: 326 10E9/L (ref 150–450)
POTASSIUM SERPL-SCNC: 4 MMOL/L (ref 3.4–5.3)
PROT SERPL-MCNC: 7.2 G/DL (ref 6.8–8.8)
RBC # BLD AUTO: 4.61 10E12/L (ref 3.8–5.2)
SODIUM SERPL-SCNC: 140 MMOL/L (ref 133–144)
WBC # BLD AUTO: 9.7 10E9/L (ref 4–11)

## 2020-09-18 PROCEDURE — 80053 COMPREHEN METABOLIC PANEL: CPT | Performed by: INTERNAL MEDICINE

## 2020-09-18 PROCEDURE — 36415 COLL VENOUS BLD VENIPUNCTURE: CPT

## 2020-09-18 PROCEDURE — 86300 IMMUNOASSAY TUMOR CA 15-3: CPT | Performed by: INTERNAL MEDICINE

## 2020-09-18 PROCEDURE — 85025 COMPLETE CBC W/AUTO DIFF WBC: CPT | Performed by: INTERNAL MEDICINE

## 2020-09-18 NOTE — PROGRESS NOTES
Medical Assistant Note:  Kimberly Branchvivien Napolesjulius presents today for blood draw.    Patient seen by provider today: No.   present during visit today: Not Applicable.    Concerns: No Concerns.    Procedure:  Labs drawn    Post Assessment:  Labs drawn without difficulty: Yes.    Discharge Plan:  Departure Mode: Ambulatory.    Face to Face Time: 10 min.    Rowan Epstein CMA

## 2020-09-22 ENCOUNTER — VIRTUAL VISIT (OUTPATIENT)
Dept: ONCOLOGY | Facility: CLINIC | Age: 55
End: 2020-09-22
Attending: INTERNAL MEDICINE
Payer: COMMERCIAL

## 2020-09-22 DIAGNOSIS — D05.11 DUCTAL CARCINOMA IN SITU (DCIS) OF RIGHT BREAST: Primary | ICD-10-CM

## 2020-09-22 PROCEDURE — 40001009 ZZH VIDEO/TELEPHONE VISIT; NO CHARGE

## 2020-09-22 PROCEDURE — 99213 OFFICE O/P EST LOW 20 MIN: CPT | Mod: 95 | Performed by: INTERNAL MEDICINE

## 2020-09-22 NOTE — PROGRESS NOTES
"Kimberly Miller is a 55 year old female who is being evaluated via a billable video visit.      The patient has been notified of following:     \"This video visit will be conducted via a call between you and your physician/provider. We have found that certain health care needs can be provided without the need for an in-person physical exam.  This service lets us provide the care you need with a video conversation.  If a prescription is necessary we can send it directly to your pharmacy.  If lab work is needed we can place an order for that and you can then stop by our lab to have the test done at a later time.    Video visits are billed at different rates depending on your insurance coverage.  Please reach out to your insurance provider with any questions.    If during the course of the call the physician/provider feels a video visit is not appropriate, you will not be charged for this service.\"    Patient has given verbal consent for Video visit? Yes  How would you like to obtain your AVS? MyChart  If you are dropped from the video visit, the video invite should be resent to:   Will anyone else be joining your video visit? No      Video-Visit Details    Type of service:  Video Visit    Video Start Time: 3:56 pm  Video End Time: 4:02 PM    Originating Location (pt. Location): Home    Distant Location (provider location):  [facility]     Platform used for Video Visit: [facility]    Hematology/Oncology Established Patient Note      Today's Date: 09/22/2020    Reason for Follow-up: BRCA 2 mutation and DCIS      HISTORY OF PRESENT ILLNESS: Kimberly Miller is a 55 year old female with history of HTN, who presents with DCIS.  She has significant family history of breast and ovarian cancer.  She is BRCA2 positive, and has been followed closely previously by Dr. Reed for high-risk.  On a surveillance MRI breast on 12/8/15, there was abnormal finding in the right " breast, and ultimately was found to have DCIS.  On 1/28/16, she underwent bilateral mastectomy by Dr. Alexander at [facility], with pathology showing DCIS of the right breast, grade 2 with occasional nuclear grade 3 cells, size 1.0 x 0.5 cm, negative for invasive carcinoma, 0/1 lymph node with carcinoma, ER positive at 75%, and SD positive at 20%.  She has undergone reconstruction.      She has history of SUMAYA-BSO in April 2008.  She notes that she still gets hot flashes occasionally.        INTERIM HISTORY: Kimberly says that she is feeling very well.  She denies any new complaints today.          REVIEW OF SYSTEMS:   14 point ROS was reviewed and is negative other than as noted above in HPI.       HOME MEDICATIONS:  Current Outpatient Medications   Medication Sig Dispense Refill     LISINOPRIL PO Take 10 mg by mouth daily       VITAMIN D, CHOLECALCIFEROL, PO Take 2,000 Units by mouth daily           ALLERGIES:  Allergies   Allergen Reactions     Penicillin [Penicillins] Rash         PAST MEDICAL HISTORY:  Past Medical History:   Diagnosis Date     Family history of breast cancer      Family history of ovarian cancer      Hypertension          PAST SURGICAL HISTORY:  Past Surgical History:   Procedure Laterality Date     BIOPSY BREAST Right 12/18/2015    MRI Biopsy     COLONOSCOPY  10/1/2012    Procedure: COLONOSCOPY;  Colonoscopy;  Surgeon: Karma Oates MD;  Location: [facility]     HC BIOPSY BREAST, PERC NEEDLE CORE, NO IMAGING Right 1/9/2008     - Benign     HC REMOVAL GALLBLADDER  1993         SOCIAL HISTORY:  Social History     Socioeconomic History     Marital status:      Spouse name: Not on file     Number of children: Not on file     Years of education: Not on file     Highest education level: Not on file   Occupational History     Not on file   Social Needs     Financial resource strain: Not on file     Food insecurity     Worry: Not on file     Inability: Not on file     Transportation needs      Medical: Not on file     Non-medical: Not on file   Tobacco Use     Smoking status: Never Smoker     Smokeless tobacco: Never Used   Substance and Sexual Activity     Alcohol use: Yes     Comment: rarely     Drug use: No     Sexual activity: Yes     Partners: Male   Lifestyle     Physical activity     Days per week: Not on file     Minutes per session: Not on file     Stress: Not on file   Relationships     Social connections     Talks on phone: Not on file     Gets together: Not on file     Attends Baptism service: Not on file     Active member of club or organization: Not on file     Attends meetings of clubs or organizations: Not on file     Relationship status: Not on file     Intimate partner violence     Fear of current or ex partner: Not on file     Emotionally abused: Not on file     Physically abused: Not on file     Forced sexual activity: Not on file   Other Topics Concern     Parent/sibling w/ CABG, MI or angioplasty before 65F 55M? No   Social History Narrative     Not on file         FAMILY HISTORY:  Family History   Problem Relation Age of Onset     C.A.D. Mother         long QT syndrome     Genetic Disorder Father         + BRCA 2; no cancer     Cancer Father 81        CMML     Cancer Sister         breast- + BRCA 2     Cancer Brother         lung diagnosed age 36     Cancer Maternal Grandmother         ovarian     Cancer Maternal Grandfather         lung     Cancer Other         distant relative with breast cancer; male     Cancer Other         distant relative with breast cancer; female     Cancer Other         distant relative with breast cancer; female         PHYSICAL EXAM:  Vital signs:  LMP 2008    ECO  GENERAL/CONSTITUTIONAL: No acute distress. Healthy, alert.  EYES: No scleral icterus.  No redness or discharge.    RESPIRATORY: No audible wheeze, cough, or visible cyanosis.  No visible retractions or increased work of breathing.  Able to speak fully in complete  sentences.  MUSCULOSKELETAL: Normal range of motion.  NEUROLOGIC: Alert, oriented, answers questions appropriately. No tremor. Mentation intact and speech normal  INTEGUMENTARY: No jaundice.  No obvious rash or skin lesions.  PSYCHIATRIC:  Mentation appears normal, affect normal/bright, judgement and insight intact, normal speech and appearance well-groomed.    The rest of a comprehensive physical exam is deferred due to public [facility] emergency video visit restrictions.        LABS:  CBC RESULTS:   Recent Labs   Lab Test 09/18/20  1335   WBC 9.7   RBC 4.61   HGB 14.3   HCT 43.2   MCV 94   MCH 31.0   MCHC 33.1   RDW 11.8        Recent Labs   Lab Test 09/18/20  1335 02/27/20  0824    138   POTASSIUM 4.0 3.8   CHLORIDE 106 107   CO2 29 27   ANIONGAP 5 4   * 72   BUN 20 15   CR 0.85 0.72   MILY 9.0 9.2     Lab Results   Component Value Date    AST 14 09/18/2020     Lab Results   Component Value Date    ALT 28 09/18/2020     No results found for: BILICONJ   Lab Results   Component Value Date    BILITOTAL 0.8 09/18/2020     Lab Results   Component Value Date    ALBUMIN 3.7 09/18/2020     Lab Results   Component Value Date    PROTTOTAL 7.2 09/18/2020      Lab Results   Component Value Date    ALKPHOS 96 09/18/2020     Component      Latest Ref Rng & Units 8/7/2018 2/19/2019 2/27/2020 9/18/2020   CA 27-29      0 - 39 U/mL 13 12 14 8         ASSESSMENT/PLAN:  Kimberly Miller is a 55 year old post-menopausal female with:    1) DCIS of the right breast: ER positive, MO positive.  +BRCA2 mutation.  She is s/p bilateral mastectomy with reconstruction.  She has also had SUMAYA-BSO in 2008.  We previously discussed chemoprevention with tamoxifen or AI, weighing the benefits and risks/toxicities.  Ultimately, as she has had a risk-reducing bilateral mastectomy, she decided not to start hormonal therapy, and opting for surveillance with labs and tumor markers.  Will obtain imaging only if symptoms arise.     Recent labs reviewed - unremarkable.  She would like to continue routine labs and tumor marker checks.    She is almost 5 years out from her diagnosis.  We discussed further follow-up in 1 year with exam versus transitioning care back to her PCP.  She says that she would rather consolidate her care with her PCP and can get breast exams and labs there.      -follow-up with PCP  -follow-up here as needed    2) Breast reconstruction: She has fat grafting surgery in November 2017 and now doing much better.  She continues to do stretching exercises for lymphedema/scarring.  -follows with Dr. Ambrosio Matta MD  Hematology/Oncology  [facility]

## 2020-09-22 NOTE — LETTER
"    9/22/2020         RE: Kimberly Miller  [address]  [place] [address]        Dear Colleague,    Thank you for referring your patient, Kimberly Miller, to the [facility]. Please see a copy of my visit note below.    Kimberly Miller is a 55 year old female who is being evaluated via a billable video visit.      The patient has been notified of following:     \"This video visit will be conducted via a call between you and your physician/provider. We have found that certain health care needs can be provided without the need for an in-person physical exam.  This service lets us provide the care you need with a video conversation.  If a prescription is necessary we can send it directly to your pharmacy.  If lab work is needed we can place an order for that and you can then stop by our lab to have the test done at a later time.    Video visits are billed at different rates depending on your insurance coverage.  Please reach out to your insurance provider with any questions.    If during the course of the call the physician/provider feels a video visit is not appropriate, you will not be charged for this service.\"    Patient has given verbal consent for Video visit? Yes  How would you like to obtain your AVS? MyChart  If you are dropped from the video visit, the video invite should be resent to:   Will anyone else be joining your video visit? No      Video-Visit Details    Type of service:  Video Visit    Video Start Time: 3:56 pm  Video End Time: 4:02 PM    Originating Location (pt. Location): Home    Distant Location (provider location):  [facility]     Platform used for Video Visit: [facility]    Hematology/Oncology Established Patient Note      Today's Date: 09/22/2020    Reason for Follow-up: BRCA 2 mutation and DCIS      HISTORY OF PRESENT ILLNESS: Kimberly Miller is a 55 year old female with history of HTN, who presents " with DCIS.  She has significant family history of breast and ovarian cancer.  She is BRCA2 positive, and has been followed closely previously by Dr. Reed for high-risk.  On a surveillance MRI breast on 12/8/15, there was abnormal finding in the right breast, and ultimately was found to have DCIS.  On 1/28/16, she underwent bilateral mastectomy by Dr. Alexander at [facility], with pathology showing DCIS of the right breast, grade 2 with occasional nuclear grade 3 cells, size 1.0 x 0.5 cm, negative for invasive carcinoma, 0/1 lymph node with carcinoma, ER positive at 75%, and DC positive at 20%.  She has undergone reconstruction.      She has history of SUMAYA-BSO in April 2008.  She notes that she still gets hot flashes occasionally.        INTERIM HISTORY: Kimberly says that she is feeling very well.  She denies any new complaints today.          REVIEW OF SYSTEMS:   14 point ROS was reviewed and is negative other than as noted above in HPI.       HOME MEDICATIONS:  Current Outpatient Medications   Medication Sig Dispense Refill     LISINOPRIL PO Take 10 mg by mouth daily       VITAMIN D, CHOLECALCIFEROL, PO Take 2,000 Units by mouth daily           ALLERGIES:  Allergies   Allergen Reactions     Penicillin [Penicillins] Rash         PAST MEDICAL HISTORY:  Past Medical History:   Diagnosis Date     Family history of breast cancer      Family history of ovarian cancer      Hypertension          PAST SURGICAL HISTORY:  Past Surgical History:   Procedure Laterality Date     BIOPSY BREAST Right 12/18/2015    MRI Biopsy     COLONOSCOPY  10/1/2012    Procedure: COLONOSCOPY;  Colonoscopy;  Surgeon: Karma Oates MD;  Location:  GI     HC BIOPSY BREAST, PERC NEEDLE CORE, NO IMAGING Right 1/9/2008     - Benign     HC REMOVAL GALLBLADDER  1993         SOCIAL HISTORY:  Social History     Socioeconomic History     Marital status:      Spouse name: Not on file     Number of children: Not on file     Years of  education: Not on file     Highest education level: Not on file   Occupational History     Not on file   Social Needs     Financial resource strain: Not on file     Food insecurity     Worry: Not on file     Inability: Not on file     Transportation needs     Medical: Not on file     Non-medical: Not on file   Tobacco Use     Smoking status: Never Smoker     Smokeless tobacco: Never Used   Substance and Sexual Activity     Alcohol use: Yes     Comment: rarely     Drug use: No     Sexual activity: Yes     Partners: Male   Lifestyle     Physical activity     Days per week: Not on file     Minutes per session: Not on file     Stress: Not on file   Relationships     Social connections     Talks on phone: Not on file     Gets together: Not on file     Attends [employer] service: Not on file     Active member of club or organization: Not on file     Attends meetings of clubs or organizations: Not on file     Relationship status: Not on file     Intimate partner violence     Fear of current or ex partner: Not on file     Emotionally abused: Not on file     Physically abused: Not on file     Forced sexual activity: Not on file   Other Topics Concern     Parent/sibling w/ CABG, MI or angioplasty before 65F 55M? No   Social History Narrative     Not on file         FAMILY HISTORY:  Family History   Problem Relation Age of Onset     C.A.D. Mother         long QT syndrome     Genetic Disorder Father         + BRCA 2; no cancer     Cancer Father 81        CMML     Cancer Sister         breast- + BRCA 2     Cancer Brother         lung diagnosed age 36     Cancer Maternal Grandmother         ovarian     Cancer Maternal Grandfather         lung     Cancer Other         distant relative with breast cancer; male     Cancer Other         distant relative with breast cancer; female     Cancer Other         distant relative with breast cancer; female         PHYSICAL EXAM:  Vital signs:  LMP 2008    ECO  GENERAL/CONSTITUTIONAL:  No acute distress. Healthy, alert.  EYES: No scleral icterus.  No redness or discharge.    RESPIRATORY: No audible wheeze, cough, or visible cyanosis.  No visible retractions or increased work of breathing.  Able to speak fully in complete sentences.  MUSCULOSKELETAL: Normal range of motion.  NEUROLOGIC: Alert, oriented, answers questions appropriately. No tremor. Mentation intact and speech normal  INTEGUMENTARY: No jaundice.  No obvious rash or skin lesions.  PSYCHIATRIC:  Mentation appears normal, affect normal/bright, judgement and insight intact, normal speech and appearance well-groomed.    The rest of a comprehensive physical exam is deferred due to public [facility] emergency video visit restrictions.        LABS:  CBC RESULTS:   Recent Labs   Lab Test 09/18/20  1335   WBC 9.7   RBC 4.61   HGB 14.3   HCT 43.2   MCV 94   MCH 31.0   MCHC 33.1   RDW 11.8        Recent Labs   Lab Test 09/18/20  1335 02/27/20  0824    138   POTASSIUM 4.0 3.8   CHLORIDE 106 107   CO2 29 27   ANIONGAP 5 4   * 72   BUN 20 15   CR 0.85 0.72   MILY 9.0 9.2     Lab Results   Component Value Date    AST 14 09/18/2020     Lab Results   Component Value Date    ALT 28 09/18/2020     No results found for: BILICONJ   Lab Results   Component Value Date    BILITOTAL 0.8 09/18/2020     Lab Results   Component Value Date    ALBUMIN 3.7 09/18/2020     Lab Results   Component Value Date    PROTTOTAL 7.2 09/18/2020      Lab Results   Component Value Date    ALKPHOS 96 09/18/2020     Component      Latest Ref Rng & Units 8/7/2018 2/19/2019 2/27/2020 9/18/2020   CA 27-29      0 - 39 U/mL 13 12 14 8         ASSESSMENT/PLAN:  Kimberly Miller is a 55 year old post-menopausal female with:    1) DCIS of the right breast: ER positive, MN positive.  +BRCA2 mutation.  She is s/p bilateral mastectomy with reconstruction.  She has also had SUMAYA-BSO in 2008.  We previously discussed chemoprevention with tamoxifen or AI, weighing the benefits  and risks/toxicities.  Ultimately, as she has had a risk-reducing bilateral mastectomy, she decided not to start hormonal therapy, and opting for surveillance with labs and tumor markers.  Will obtain imaging only if symptoms arise.    Recent labs reviewed - unremarkable.  She would like to continue routine labs and tumor marker checks.    She is almost 5 years out from her diagnosis.  We discussed further follow-up in 1 year with exam versus transitioning care back to her PCP.  She says that she would rather consolidate her care with her PCP and can get breast exams and labs there.      -follow-up with PCP  -follow-up here as needed    2) Breast reconstruction: She has fat grafting surgery in November 2017 and now doing much better.  She continues to do stretching exercises for lymphedema/scarring.  -follows with Dr. Ambrosio Matta MD  Hematology/Oncology  [facility]      Again, thank you for allowing me to participate in the care of your patient.        Sincerely,        Becky Matta MD

## 2021-01-15 ENCOUNTER — HEALTH MAINTENANCE LETTER (OUTPATIENT)
Age: 56
End: 2021-01-15

## 2022-10-21 ENCOUNTER — TELEPHONE (OUTPATIENT)
Dept: GASTROENTEROLOGY | Facility: CLINIC | Age: 57
End: 2022-10-21

## 2022-10-21 NOTE — TELEPHONE ENCOUNTER
CALLER NAME:  ABIGAIL  NUMBER TO REACH CALLER: [phone]    PROVIDER: JUDITH  DATE: 01/09/2023   TIME 945AM  LOCATION: [facility]  PROCEDURE: Lower Endoscopy [Colonoscopy]   DIAGNOSIS : SCREENING   SEDATION: MOD    ADDITIONAL INFORMATION:

## 2022-10-24 ENCOUNTER — TELEPHONE (OUTPATIENT)
Dept: GASTROENTEROLOGY | Facility: CLINIC | Age: 57
End: 2022-10-24

## 2022-10-24 NOTE — TELEPHONE ENCOUNTER
Caller: NEPTALI BARTHOLOMEW [phone]     Procedure: COLONOSCOPY    Date, Location, and Surgeon of Procedure Cancelled: 1/9, [facility]    Ordering Provider:JUDITH    Reason for cancel (please be detailed, any staff messages or encounters to note?): PATIENT DOES NOT HAVE TRANSPORTATION FOR APPOINTMENT.        Rescheduled: Y     If rescheduled:    Date: 12/20   Location:    Prep Resent: (changes to prep?)   Covid Test Rescheduled:    Note any change or update to original order/sedation:

## 2022-12-03 ENCOUNTER — HEALTH MAINTENANCE LETTER (OUTPATIENT)
Age: 57
End: 2022-12-03

## 2022-12-09 RX ORDER — CHLORAL HYDRATE 500 MG
2 CAPSULE ORAL DAILY
COMMUNITY

## 2022-12-09 RX ORDER — ASPIRIN 81 MG/1
81 TABLET, CHEWABLE ORAL DAILY
COMMUNITY
End: 2024-10-01

## 2022-12-20 ENCOUNTER — HOSPITAL ENCOUNTER (OUTPATIENT)
Facility: CLINIC | Age: 57
Discharge: HOME | End: 2022-12-20
Attending: COLON & RECTAL SURGERY | Admitting: COLON & RECTAL SURGERY
Payer: COMMERCIAL

## 2022-12-20 VITALS
SYSTOLIC BLOOD PRESSURE: 164 MMHG | WEIGHT: 223 LBS | OXYGEN SATURATION: 100 % | BODY MASS INDEX: 37.15 KG/M2 | HEIGHT: 65 IN | HEART RATE: 61 BPM | RESPIRATION RATE: 14 BRPM | TEMPERATURE: 98.1 F | DIASTOLIC BLOOD PRESSURE: 85 MMHG

## 2022-12-20 LAB — COLONOSCOPY: NORMAL

## 2022-12-20 PROCEDURE — 45385 COLONOSCOPY W/LESION REMOVAL: CPT | Mod: PT | Performed by: COLON & RECTAL SURGERY

## 2022-12-20 PROCEDURE — 88305 TISSUE EXAM BY PATHOLOGIST: CPT | Mod: 26 | Performed by: PATHOLOGY

## 2022-12-20 PROCEDURE — 99153 MOD SED SAME PHYS/QHP EA: CPT | Performed by: COLON & RECTAL SURGERY

## 2022-12-20 PROCEDURE — 88305 TISSUE EXAM BY PATHOLOGIST: CPT | Mod: TC | Performed by: COLON & RECTAL SURGERY

## 2022-12-20 PROCEDURE — G0500 MOD SEDAT ENDO SERVICE >5YRS: HCPCS | Performed by: COLON & RECTAL SURGERY

## 2022-12-20 PROCEDURE — 45380 COLONOSCOPY AND BIOPSY: CPT | Mod: XS,PT | Performed by: COLON & RECTAL SURGERY

## 2022-12-20 PROCEDURE — 250N000011 HC RX IP 250 OP 636: Performed by: COLON & RECTAL SURGERY

## 2022-12-20 PROCEDURE — 45382 COLONOSCOPY W/CONTROL BLEED: CPT | Performed by: COLON & RECTAL SURGERY

## 2022-12-20 RX ORDER — NALOXONE HYDROCHLORIDE 0.4 MG/ML
0.2 INJECTION, SOLUTION INTRAMUSCULAR; INTRAVENOUS; SUBCUTANEOUS
Status: DISCONTINUED | OUTPATIENT
Start: 2022-12-20 | End: 2022-12-20 | Stop reason: HOSPADM

## 2022-12-20 RX ORDER — ATROPINE SULFATE 0.1 MG/ML
1 INJECTION INTRAVENOUS
Status: DISCONTINUED | OUTPATIENT
Start: 2022-12-20 | End: 2022-12-20 | Stop reason: HOSPADM

## 2022-12-20 RX ORDER — NALOXONE HYDROCHLORIDE 0.4 MG/ML
0.4 INJECTION, SOLUTION INTRAMUSCULAR; INTRAVENOUS; SUBCUTANEOUS
Status: DISCONTINUED | OUTPATIENT
Start: 2022-12-20 | End: 2022-12-20 | Stop reason: HOSPADM

## 2022-12-20 RX ORDER — SIMETHICONE 40MG/0.6ML
133 SUSPENSION, DROPS(FINAL DOSAGE FORM)(ML) ORAL
Status: DISCONTINUED | OUTPATIENT
Start: 2022-12-20 | End: 2022-12-20 | Stop reason: HOSPADM

## 2022-12-20 RX ORDER — ONDANSETRON 2 MG/ML
4 INJECTION INTRAMUSCULAR; INTRAVENOUS EVERY 6 HOURS PRN
Status: DISCONTINUED | OUTPATIENT
Start: 2022-12-20 | End: 2022-12-20 | Stop reason: HOSPADM

## 2022-12-20 RX ORDER — DIPHENHYDRAMINE HYDROCHLORIDE 50 MG/ML
25-50 INJECTION INTRAMUSCULAR; INTRAVENOUS
Status: DISCONTINUED | OUTPATIENT
Start: 2022-12-20 | End: 2022-12-20 | Stop reason: HOSPADM

## 2022-12-20 RX ORDER — PROCHLORPERAZINE MALEATE 10 MG
10 TABLET ORAL EVERY 6 HOURS PRN
Status: DISCONTINUED | OUTPATIENT
Start: 2022-12-20 | End: 2022-12-20 | Stop reason: HOSPADM

## 2022-12-20 RX ORDER — FENTANYL CITRATE 0.05 MG/ML
50-100 INJECTION, SOLUTION INTRAMUSCULAR; INTRAVENOUS EVERY 5 MIN PRN
Status: DISCONTINUED | OUTPATIENT
Start: 2022-12-20 | End: 2022-12-20 | Stop reason: HOSPADM

## 2022-12-20 RX ORDER — EPINEPHRINE 1 MG/ML
0.1 INJECTION, SOLUTION INTRAMUSCULAR; SUBCUTANEOUS
Status: DISCONTINUED | OUTPATIENT
Start: 2022-12-20 | End: 2022-12-20 | Stop reason: HOSPADM

## 2022-12-20 RX ORDER — ONDANSETRON 4 MG/1
4 TABLET, ORALLY DISINTEGRATING ORAL EVERY 6 HOURS PRN
Status: DISCONTINUED | OUTPATIENT
Start: 2022-12-20 | End: 2022-12-20 | Stop reason: HOSPADM

## 2022-12-20 RX ORDER — ONDANSETRON 2 MG/ML
4 INJECTION INTRAMUSCULAR; INTRAVENOUS
Status: DISCONTINUED | OUTPATIENT
Start: 2022-12-20 | End: 2022-12-20 | Stop reason: HOSPADM

## 2022-12-20 RX ORDER — FLUMAZENIL 0.1 MG/ML
0.2 INJECTION, SOLUTION INTRAVENOUS
Status: DISCONTINUED | OUTPATIENT
Start: 2022-12-20 | End: 2022-12-20 | Stop reason: HOSPADM

## 2022-12-20 RX ORDER — LIDOCAINE 40 MG/G
CREAM TOPICAL
Status: DISCONTINUED | OUTPATIENT
Start: 2022-12-20 | End: 2022-12-20 | Stop reason: HOSPADM

## 2022-12-20 RX ADMIN — MIDAZOLAM HYDROCHLORIDE 2 MG: 1 INJECTION, SOLUTION INTRAMUSCULAR; INTRAVENOUS at 08:16

## 2022-12-20 RX ADMIN — FENTANYL CITRATE 100 MCG: 0.05 INJECTION, SOLUTION INTRAMUSCULAR; INTRAVENOUS at 08:14

## 2022-12-20 RX ADMIN — FENTANYL CITRATE 50 MCG: 0.05 INJECTION, SOLUTION INTRAMUSCULAR; INTRAVENOUS at 08:20

## 2022-12-20 ASSESSMENT — ACTIVITIES OF DAILY LIVING (ADL): ADLS_ACUITY_SCORE: 35

## 2022-12-20 NOTE — H&P
Pre-Endoscopy History and Physical     Kimberly Miller MRN# [id number]   YOB: 1965 Age: 57 year old     Date of Procedure: 12/20/2022  Primary care provider: Chase Coyne  Type of Endoscopy: Colonoscopy  Reason for Procedure: H/P polyps, family h/o polyps, BRCA +  Type of Anesthesia Anticipated: Moderate Sedation    HPI:    Kimberly is a 57 year old female who will be undergoing the above procedure.      A history and physical has been performed. The patient's medications and allergies have been reviewed. The risks and benefits of the procedure and the sedation options and risks were discussed with the patient.  All questions were answered and informed consent was obtained.      She denies a personal or family history of anesthesia complications or bleeding disorders.     Allergies   Allergen Reactions     Penicillin [Penicillins] Rash        No current facility-administered medications on file prior to encounter.  aspirin (ASA) 81 MG chewable tablet, Take 81 mg by mouth daily  fish oil-omega-3 fatty acids 1000 MG capsule, Take 2 g by mouth daily  LISINOPRIL PO, Take 10 mg by mouth daily  VITAMIN D, CHOLECALCIFEROL, PO, Take 2,000 Units by mouth daily        Patient Active Problem List   Diagnosis     Family history of breast cancer     Family history of ovarian cancer     Encounter for long-term current use of medication     Ductal carcinoma in situ (DCIS) of right breast        Past Medical History:   Diagnosis Date     Family history of breast cancer      Family history of ovarian cancer      Hypertension         Past Surgical History:   Procedure Laterality Date     BIOPSY BREAST Right 12/18/2015    MRI Biopsy     COLONOSCOPY  10/01/2012    Procedure: COLONOSCOPY;  Colonoscopy;  Surgeon: Karma Oates MD;  Location:  GI     HC BIOPSY BREAST, PERC NEEDLE CORE, NO IMAGING Right 01/09/2008     - Benign     HC REMOVAL GALLBLADDER  01/01/1993     HYSTERECTOMY         Social History  "    Tobacco Use     Smoking status: Never     Smokeless tobacco: Never   Substance Use Topics     Alcohol use: Yes     Comment: rarely       Family History   Problem Relation Age of Onset     C.A.D. Mother         long QT syndrome     Genetic Disorder Father         + BRCA 2; no cancer     Cancer Father 81        CMML     Cancer Maternal Grandmother         ovarian     Cancer Maternal Grandfather         lung     Cancer Brother         lung diagnosed age 36     Cancer Sister         breast- + BRCA 2     Cancer Other         distant relative with breast cancer; male     Cancer Other         distant relative with breast cancer; female     Cancer Other         distant relative with breast cancer; female     Colon Cancer No family hx of        REVIEW OF SYSTEMS:     5 point ROS negative except as noted above in HPI, including Gen., Resp., CV, GI &  system review.      PHYSICAL EXAM:   Ht 1.66 m (5' 5.35\")   Wt 101.2 kg (223 lb)   LMP 04/14/2008   BMI 36.71 kg/m   Estimated body mass index is 36.71 kg/m  as calculated from the following:    Height as of this encounter: 1.66 m (5' 5.35\").    Weight as of this encounter: 101.2 kg (223 lb).   GENERAL APPEARANCE: healthy and alert  MENTAL STATUS: alert  AIRWAY EXAM: Mallampatti Class I (visualization of the soft palate, fauces, uvula, anterior and posterior pillars)  RESP: lungs clear to auscultation - no rales, rhonchi or wheezes  CV: regular rates and rhythm      IMPRESSION   ASA Class 2 - Mild systemic disease        PLAN:     Plan for colonoscopy. We discussed the risks, benefits and alternatives and the patient wished to proceed.    The above has been forwarded to the consulting provider.      Ashley Ely MD  [facility]  Phone: [phone]  Fax: [phone]  December 20, 2022    "

## 2022-12-21 LAB
PATH REPORT.COMMENTS IMP SPEC: NORMAL
PATH REPORT.FINAL DX SPEC: NORMAL
PATH REPORT.GROSS SPEC: NORMAL
PATH REPORT.MICROSCOPIC SPEC OTHER STN: NORMAL
PATH REPORT.RELEVANT HX SPEC: NORMAL
PHOTO IMAGE: NORMAL

## 2023-03-26 ENCOUNTER — HEALTH MAINTENANCE LETTER (OUTPATIENT)
Age: 58
End: 2023-03-26

## 2023-06-21 NOTE — LETTER
"    9/22/2020         RE: Kimberly Miller  [address]  [facility] [address]        Dear Colleague,    Thank you for referring your patient, Kimberly Miller, to the [facility]. Please see a copy of my visit note below.    Kimberly Miller is a 55 year old female who is being evaluated via a billable video visit.      The patient has been notified of following:     \"This video visit will be conducted via a call between you and your physician/provider. We have found that certain health care needs can be provided without the need for an in-person physical exam.  This service lets us provide the care you need with a video conversation.  If a prescription is necessary we can send it directly to your pharmacy.  If lab work is needed we can place an order for that and you can then stop by our lab to have the test done at a later time.    Video visits are billed at different rates depending on your insurance coverage.  Please reach out to your insurance provider with any questions.    If during the course of the call the physician/provider feels a video visit is not appropriate, you will not be charged for this service.\"    Patient has given verbal consent for Video visit? Yes  How would you like to obtain your AVS? MyChart  If you are dropped from the video visit, the video invite should be resent to:   Will anyone else be joining your video visit? No      Video-Visit Details    Type of service:  Video Visit    Video Start Time: 3:56 pm  Video End Time: 4:02 PM    Originating Location (pt. Location): Home    Distant Location (provider location):  [facility]     Platform used for Video Visit: [facility]    Hematology/Oncology Established Patient Note      Today's Date: 09/22/2020    Reason for Follow-up: BRCA 2 mutation and DCIS      HISTORY OF PRESENT ILLNESS: Kimberly Miller is a 55 year old female with history of HTN, who presents " Inpatient Rehabilitation - Occupational Therapy Treatment Note    [facility]     Patient Name: Vernon Solorzano  : 1948  MRN: [id number]    Today's Date: 2023                 Admit Date: 2023         ICD-10-CM ICD-9-CM   1. Impaired functional mobility, balance, gait, and endurance  Z74.09 V49.89       Patient Active Problem List   Diagnosis    DM (diabetes mellitus), type 2    Mixed hyperlipidemia    Mild intermittent asthma without complication    New onset of congestive heart failure    Nonrheumatic mitral valve regurgitation    Chest pain    Essential hypertension    Mitral valve disease    Acute ischemic left MCA stroke       Past Medical History:   Diagnosis Date    Allergic rhinitis     Arthritis     BPH (benign prostatic hyperplasia)     Diabetes mellitus     TYPE 2    Foraminal stenosis of cervical region     C5-C6    GERD (gastroesophageal reflux disease)     Hemorrhoids     History of urinary retention     S/P TURP    Hyperlipidemia     Macular degeneration     PING (obstructive sleep apnea) 2016    MILD, SEES DR. AMARILIS MORGAN       Past Surgical History:   Procedure Laterality Date    CARDIAC CATHETERIZATION N/A 2023    Procedure: Right Heart Cath;  Surgeon: Corey Gaffney MD;  Location: [facility] CATH INVASIVE LOCATION;  Service: Cardiology;  Laterality: N/A;    CARDIAC CATHETERIZATION N/A 2023    Procedure: Left Heart Cath;  Surgeon: Corey Gaffney MD;  Location: [facility] CATH INVASIVE LOCATION;  Service: Cardiology;  Laterality: N/A;    CARDIAC CATHETERIZATION N/A 2023    Procedure: Left ventriculography;  Surgeon: Corey Gaffney MD;  Location: [facility] CATH INVASIVE LOCATION;  Service: Cardiology;  Laterality: N/A;    CARDIAC CATHETERIZATION N/A 2023    Procedure: Coronary angiography;  Surgeon: Corey Gaffney MD;  [facility];  Service: Cardiology;  Laterality: N/A;    COLONOSCOPY N/A     WNL PER PT, NO RECORDS,      COLONOSCOPY N/A 04/07/2009    DR. GORGE BENAVIDEZ AT [facility]    MITRAL VALVE REPAIR/REPLACEMENT N/A 5/24/2023    Procedure: MITRAL VALVE REPAIR/REPLACEMENT TRICUSPID VALVE REPAIR/REPLACEMENT TRANSESOPHAGEAL ECHOCARDIOGRAM WITH ANESTHESIA;  Surgeon: George Frey MD;  Location: [facility];  Service: Cardiothoracic;  Laterality: N/A;    PROSTATE SURGERY N/A 11/12/2010    TURP, DR. BRIGHT COLLAZO AT [facility]    SKIN TAG REMOVAL N/A 12/20/2017    ANAL SKIN TAG X2, PERFORMED IN OFFICE, DR. LISA ORANTES    TURP / TRANSURETHRAL INCISION / DRAINAGE PROSTATE  2010    Dr. Goodrich             IRF OT ASSESSMENT FLOWSHEET (last 12 hours)       IRF OT Evaluation and Treatment       Row Name 06/21/23 1550 06/21/23 1500       OT Time and Intention    Document Type daily treatment  -CC daily treatment  -SM    Mode of Treatment occupational therapy;individual therapy  -CC individual therapy;occupational therapy  -SM    Patient Effort good  -CC good  -SM    Symptoms Noted During/After Treatment fatigue  -CC --      Row Name 06/21/23 1500          General Information    Patient/Family/Caregiver Comments/Observations Pt agreeable to complete UE strengthening session this afternoon  -SM       Row Name 06/21/23 1550 06/21/23 1500       Pain Assessment    Pretreatment Pain Rating 0/10 - no pain  -CC 0/10 - no pain  -SM    Posttreatment Pain Rating 0/10 - no pain  -CC 0/10 - no pain  -SM      Row Name 06/21/23 1550          Vision Assessment/Intervention    Visual Impairment/Limitations WFL  -CC       Row Name 06/21/23 1550          Bathing    Rolette Level (Bathing) bathing skills;lower body;upper body;standby assist;contact guard assist  -CC     Assistive Device (Bathing) grab bar/tub rail;hand held shower spray hose;shower chair  -CC     Position (Bathing) sink side;supported sitting;supported standing  -CC     Set-up Assistance (Bathing) obtain supplies  -CC       Row Name 06/21/23 1550          Upper Body Dressing     with DCIS.  She has significant family history of breast and ovarian cancer.  She is BRCA2 positive, and has been followed closely previously by Dr. Reed for high-risk.  On a surveillance MRI breast on 12/8/15, there was abnormal finding in the right breast, and ultimately was found to have DCIS.  On 1/28/16, she underwent bilateral mastectomy by Dr. Alexander at [facility], with pathology showing DCIS of the right breast, grade 2 with occasional nuclear grade 3 cells, size 1.0 x 0.5 cm, negative for invasive carcinoma, 0/1 lymph node with carcinoma, ER positive at 75%, and NE positive at 20%.  She has undergone reconstruction.      She has history of SUMAYA-BSO in April 2008.  She notes that she still gets hot flashes occasionally.        INTERIM HISTORY: Kimberly says that she is feeling very well.  She denies any new complaints today.          REVIEW OF SYSTEMS:   14 point ROS was reviewed and is negative other than as noted above in HPI.       HOME MEDICATIONS:  Current Outpatient Medications   Medication Sig Dispense Refill     LISINOPRIL PO Take 10 mg by mouth daily       VITAMIN D, CHOLECALCIFEROL, PO Take 2,000 Units by mouth daily           ALLERGIES:  Allergies   Allergen Reactions     Penicillin [Penicillins] Rash         PAST MEDICAL HISTORY:  Past Medical History:   Diagnosis Date     Family history of breast cancer      Family history of ovarian cancer      Hypertension          PAST SURGICAL HISTORY:  Past Surgical History:   Procedure Laterality Date     BIOPSY BREAST Right 12/18/2015    MRI Biopsy     COLONOSCOPY  10/1/2012    Procedure: COLONOSCOPY;  Colonoscopy;  Surgeon: Karma Oates MD;  Location:  GI     HC BIOPSY BREAST, PERC NEEDLE CORE, NO IMAGING Right 1/9/2008     - Benign     HC REMOVAL GALLBLADDER  1993         SOCIAL HISTORY:  Social History     Socioeconomic History     Marital status:      Spouse name: Not on file     Number of children: Not on file     Years of  Tomball Level (Upper Body Dressing) doff;don;upper body dressing skills;pull over garment;set up assistance  -     Position (Upper Body Dressing) supported sitting  -     Set-up Assistance (Upper Body Dressing) obtain clothing  -       Row Name 06/21/23 1550          Lower Body Dressing    Tomball Level (Lower Body Dressing) doff;don;pants/bottoms;shoes/slippers;socks;underwear;minimum assist (75% patient effort)  -     Position (Lower Body Dressing) supported sitting;supported standing  -     Set-up Assistance (Lower Body Dressing) obtain clothing;anti-embolic stockings  -     Comment (Lower Body Dressing) dep w compression stockings  -       Row Name 06/21/23 1550          Grooming    Tomball Level (Grooming) grooming skills;deodorant application;hair care, combing/brushing;oral care regimen;wash face, hands;set up  -     Position (Grooming) supported sitting  -     Set-up Assistance (Grooming) obtain supplies  -       Row Name 06/21/23 1550          Bed Mobility    Comment, (Bed Mobility) seated EOB  -       Row Name 06/21/23 1550          Functional Mobility    Functional Mobility- Ind. Level contact guard assist;supervision required  -     Functional Mobility- Device walker, front-wheeled  -     Functional Mobility-Distance (Feet) --  to BR  -       Row Name 06/21/23 1550          Sit-Stand Transfer    Sit-Stand Tomball (Transfers) verbal cues;standby assist  -     Assistive Device (Sit-Stand Transfers) wheelchair;walker, front-wheeled  -       Row Name 06/21/23 1550          Stand-Sit Transfer    Stand-Sit Tomball (Transfers) verbal cues;standby assist  -     Assistive Device (Stand-Sit Transfers) wheelchair  -       Row Name 06/21/23 1550          Shower Transfer    Type (Shower Transfer) stand-sit;sit-stand  -     Tomball Level (Shower Transfer) stand by assist;contact guard  -     Assistive Device (Shower Transfer) grab bar,  education: Not on file     Highest education level: Not on file   Occupational History     Not on file   Social Needs     Financial resource strain: Not on file     Food insecurity     Worry: Not on file     Inability: Not on file     Transportation needs     Medical: Not on file     Non-medical: Not on file   Tobacco Use     Smoking status: Never Smoker     Smokeless tobacco: Never Used   Substance and Sexual Activity     Alcohol use: Yes     Comment: rarely     Drug use: No     Sexual activity: Yes     Partners: Male   Lifestyle     Physical activity     Days per week: Not on file     Minutes per session: Not on file     Stress: Not on file   Relationships     Social connections     Talks on phone: Not on file     Gets together: Not on file     Attends Sabianist service: Not on file     Active member of club or organization: Not on file     Attends meetings of clubs or organizations: Not on file     Relationship status: Not on file     Intimate partner violence     Fear of current or ex partner: Not on file     Emotionally abused: Not on file     Physically abused: Not on file     Forced sexual activity: Not on file   Other Topics Concern     Parent/sibling w/ CABG, MI or angioplasty before 65F 55M? No   Social History Narrative     Not on file         FAMILY HISTORY:  Family History   Problem Relation Age of Onset     C.A.D. Mother         long QT syndrome     Genetic Disorder Father         + BRCA 2; no cancer     Cancer Father 81        CMML     Cancer Sister         breast- + BRCA 2     Cancer Brother         lung diagnosed age 36     Cancer Maternal Grandmother         ovarian     Cancer Maternal Grandfather         lung     Cancer Other         distant relative with breast cancer; male     Cancer Other         distant relative with breast cancer; female     Cancer Other         distant relative with breast cancer; female         PHYSICAL EXAM:  Vital signs:  LMP 2008    ECO  GENERAL/CONSTITUTIONAL:  No acute distress. Healthy, alert.  EYES: No scleral icterus.  No redness or discharge.    RESPIRATORY: No audible wheeze, cough, or visible cyanosis.  No visible retractions or increased work of breathing.  Able to speak fully in complete sentences.  MUSCULOSKELETAL: Normal range of motion.  NEUROLOGIC: Alert, oriented, answers questions appropriately. No tremor. Mentation intact and speech normal  INTEGUMENTARY: No jaundice.  No obvious rash or skin lesions.  PSYCHIATRIC:  Mentation appears normal, affect normal/bright, judgement and insight intact, normal speech and appearance well-groomed.    The rest of a comprehensive physical exam is deferred due to public [facility] emergency video visit restrictions.        LABS:  CBC RESULTS:   Recent Labs   Lab Test 09/18/20  1335   WBC 9.7   RBC 4.61   HGB 14.3   HCT 43.2   MCV 94   MCH 31.0   MCHC 33.1   RDW 11.8        Recent Labs   Lab Test 09/18/20  1335 02/27/20  0824    138   POTASSIUM 4.0 3.8   CHLORIDE 106 107   CO2 29 27   ANIONGAP 5 4   * 72   BUN 20 15   CR 0.85 0.72   MILY 9.0 9.2     Lab Results   Component Value Date    AST 14 09/18/2020     Lab Results   Component Value Date    ALT 28 09/18/2020     No results found for: BILICONJ   Lab Results   Component Value Date    BILITOTAL 0.8 09/18/2020     Lab Results   Component Value Date    ALBUMIN 3.7 09/18/2020     Lab Results   Component Value Date    PROTTOTAL 7.2 09/18/2020      Lab Results   Component Value Date    ALKPHOS 96 09/18/2020     Component      Latest Ref Rng & Units 8/7/2018 2/19/2019 2/27/2020 9/18/2020   CA 27-29      0 - 39 U/mL 13 12 14 8         ASSESSMENT/PLAN:  Kimberly Miller is a 55 year old post-menopausal female with:    1) DCIS of the right breast: ER positive, SC positive.  +BRCA2 mutation.  She is s/p bilateral mastectomy with reconstruction.  She has also had SUMAYA-BSO in 2008.  We previously discussed chemoprevention with tamoxifen or AI, weighing the benefits  tub/shower;shower chair;walker, front-wheeled  -       Row Name 06/21/23 1500          Shoulder (Therapeutic Exercise)    Shoulder (Therapeutic Exercise) strengthening exercise  -     Shoulder Strengthening (Therapeutic Exercise) bilateral;flexion;extension;external rotation;internal rotation;1 lb free weight;2 lb free weight;2 sets;10 repetitions  forward punch, increased difficulty with overhead activites  -       Row Name 06/21/23 1500          Elbow/Forearm (Therapeutic Exercise)    Elbow/Forearm Strengthening (Therapeutic Exercise) bilateral;flexion;extension;supination;pronation;2 lb free weight;2 sets;10 repetitions  -       Row Name 06/21/23 1550          Balance    Static Sitting Balance supervision  -     Static Standing Balance standby assist  -     Dynamic Standing Balance contact guard  -       Row Name 06/21/23 1550 06/21/23 1500       Positioning and Restraints    Pre-Treatment Position in bed  - sitting in chair/recliner  -    In Wheelchair sitting;exit alarm on;with SLP  -CC exit alarm on;with family/caregiver  -      Row Name 06/21/23 1550          Vital Signs    Intra SpO2 (%) 90  RN notfied  -CC     O2 Delivery Intra Treatment room air  -               User Key  (r) = Recorded By, (t) = Taken By, (c) = Cosigned By      Initials Name Effective Dates    CC Kathrine Pinedo, GARYR 06/16/21 -      Latisha Kauffman OT 04/02/20 -                      Occupational Therapy Education       Title: PT OT SLP Therapies (In Progress)       Topic: Occupational Therapy (In Progress)       Point: ADL training (In Progress)       Description:   Instruct learner(s) on proper safety adaptation and remediation techniques during self care or transfers.   Instruct in proper use of assistive devices.                  Learning Progress Summary             Patient Acceptance, E, NR by AF at 6/13/2023 1527    Comment: benefits of therapy, endurance   Family Acceptance, E, NR by AF at 6/13/2023 1527     Comment: benefits of therapy, endurance                         Point: Home exercise program (In Progress)       Description:   Instruct learner(s) on appropriate technique for monitoring, assisting and/or progressing therapeutic exercises/activities.                  Learning Progress Summary             Patient Acceptance, E, NR by AF at 6/13/2023 1527    Comment: benefits of therapy, endurance   Family Acceptance, E, NR by AF at 6/13/2023 1527    Comment: benefits of therapy, endurance                         Point: Precautions (In Progress)       Description:   Instruct learner(s) on prescribed precautions during self-care and functional transfers.                  Learning Progress Summary             Patient Acceptance, E, NR by AF at 6/13/2023 1527    Comment: benefits of therapy, endurance   Family Acceptance, E, NR by AF at 6/13/2023 1527    Comment: benefits of therapy, endurance                         Point: Body mechanics (In Progress)       Description:   Instruct learner(s) on proper positioning and spine alignment during self-care, functional mobility activities and/or exercises.                  Learning Progress Summary             Patient Acceptance, E, NR by AF at 6/13/2023 1527    Comment: benefits of therapy, endurance   Family Acceptance, E, NR by AF at 6/13/2023 1527    Comment: benefits of therapy, endurance                                         User Key       Initials Effective Dates Name Provider Type Discipline     06/16/21 -  Candy Davis, OTR Occupational Therapist OT                        OT Recommendation and Plan                         Time Calculation:      Time Calculation- OT       Row Name 06/21/23 1541 06/21/23 0815          Time Calculation- OT    OT Start Time 1330  -SM 0815  -CC     OT Stop Time 1345  -SM 0900  -CC     OT Time Calculation (min) 15 min  -SM 45 min  -CC               User Key  (r) = Recorded By, (t) = Taken By, (c) = Cosigned By      Initials Name  and risks/toxicities.  Ultimately, as she has had a risk-reducing bilateral mastectomy, she decided not to start hormonal therapy, and opting for surveillance with labs and tumor markers.  Will obtain imaging only if symptoms arise.    Recent labs reviewed - unremarkable.  She would like to continue routine labs and tumor marker checks.    She is almost 5 years out from her diagnosis.  We discussed further follow-up in 1 year with exam versus transitioning care back to her PCP.  She says that she would rather consolidate her care with her PCP and can get breast exams and labs there.      -follow-up with PCP  -follow-up here as needed    2) Breast reconstruction: She has fat grafting surgery in November 2017 and now doing much better.  She continues to do stretching exercises for lymphedema/scarring.  -follows with Dr. Ambrosio Matta MD  Hematology/Oncology  [facility]      Again, thank you for allowing me to participate in the care of your patient.        Sincerely,        Becky Matta MD     Provider Type    CC Kathrine Pinedo OTR Occupational Therapist    Latisha Rhodes, OT Occupational Therapist                  Therapy Charges for Today       Code Description Service Date Service Provider Modifiers Qty    [id number] HC OT SELF CARE/MGMT/TRAIN EA 15 MIN 6/20/2023 Kathrine Pinedo OTR GO 2    [id number]  OT THER PROC EA 15 MIN 6/20/2023 Kathrine Pinedo OTR GO 2                     HI Morgan  6/21/2023

## 2024-06-01 ENCOUNTER — HEALTH MAINTENANCE LETTER (OUTPATIENT)
Age: 59
End: 2024-06-01

## 2024-09-25 ENCOUNTER — HOSPITAL ENCOUNTER (EMERGENCY)
Facility: CLINIC | Age: 59
Discharge: HOME | End: 2024-09-25
Attending: EMERGENCY MEDICINE | Admitting: EMERGENCY MEDICINE
Payer: COMMERCIAL

## 2024-09-25 VITALS
TEMPERATURE: 97.5 F | WEIGHT: 230 LBS | SYSTOLIC BLOOD PRESSURE: 135 MMHG | HEART RATE: 74 BPM | BODY MASS INDEX: 37.87 KG/M2 | OXYGEN SATURATION: 97 % | DIASTOLIC BLOOD PRESSURE: 72 MMHG | RESPIRATION RATE: 10 BRPM

## 2024-09-25 DIAGNOSIS — I48.0 PAROXYSMAL ATRIAL FIBRILLATION (H): ICD-10-CM

## 2024-09-25 LAB
ANION GAP SERPL CALCULATED.3IONS-SCNC: 12 MMOL/L (ref 7–15)
ATRIAL RATE - MUSE: 68 BPM
ATRIAL RATE - MUSE: NORMAL BPM
BASOPHILS # BLD AUTO: 0.1 10E3/UL (ref 0–0.2)
BASOPHILS NFR BLD AUTO: 1 %
BUN SERPL-MCNC: 18.2 MG/DL (ref 8–23)
CALCIUM SERPL-MCNC: 9.4 MG/DL (ref 8.8–10.4)
CHLORIDE SERPL-SCNC: 106 MMOL/L (ref 98–107)
CREAT SERPL-MCNC: 0.88 MG/DL (ref 0.51–0.95)
DIASTOLIC BLOOD PRESSURE - MUSE: NORMAL MMHG
DIASTOLIC BLOOD PRESSURE - MUSE: NORMAL MMHG
EGFRCR SERPLBLD CKD-EPI 2021: 75 ML/MIN/1.73M2
EOSINOPHIL # BLD AUTO: 0.1 10E3/UL (ref 0–0.7)
EOSINOPHIL NFR BLD AUTO: 2 %
ERYTHROCYTE [DISTWIDTH] IN BLOOD BY AUTOMATED COUNT: 12.3 % (ref 10–15)
GLUCOSE SERPL-MCNC: 105 MG/DL (ref 70–99)
HCO3 SERPL-SCNC: 24 MMOL/L (ref 22–29)
HCT VFR BLD AUTO: 43.8 % (ref 35–47)
HGB BLD-MCNC: 15.1 G/DL (ref 11.7–15.7)
HOLD SPECIMEN: NORMAL
HOLD SPECIMEN: NORMAL
IMM GRANULOCYTES # BLD: 0 10E3/UL
IMM GRANULOCYTES NFR BLD: 0 %
INTERPRETATION ECG - MUSE: NORMAL
INTERPRETATION ECG - MUSE: NORMAL
LYMPHOCYTES # BLD AUTO: 3.2 10E3/UL (ref 0.8–5.3)
LYMPHOCYTES NFR BLD AUTO: 43 %
MAGNESIUM SERPL-MCNC: 2.1 MG/DL (ref 1.7–2.3)
MCH RBC QN AUTO: 30.8 PG (ref 26.5–33)
MCHC RBC AUTO-ENTMCNC: 34.5 G/DL (ref 31.5–36.5)
MCV RBC AUTO: 89 FL (ref 78–100)
MONOCYTES # BLD AUTO: 0.6 10E3/UL (ref 0–1.3)
MONOCYTES NFR BLD AUTO: 8 %
NEUTROPHILS # BLD AUTO: 3.5 10E3/UL (ref 1.6–8.3)
NEUTROPHILS NFR BLD AUTO: 47 %
NRBC # BLD AUTO: 0 10E3/UL
NRBC BLD AUTO-RTO: 0 /100
P AXIS - MUSE: 20 DEGREES
P AXIS - MUSE: NORMAL DEGREES
PLATELET # BLD AUTO: 338 10E3/UL (ref 150–450)
POTASSIUM SERPL-SCNC: 3.9 MMOL/L (ref 3.4–5.3)
PR INTERVAL - MUSE: 176 MS
PR INTERVAL - MUSE: NORMAL MS
QRS DURATION - MUSE: 78 MS
QRS DURATION - MUSE: 88 MS
QT - MUSE: 320 MS
QT - MUSE: 386 MS
QTC - MUSE: 410 MS
QTC - MUSE: 481 MS
R AXIS - MUSE: 37 DEGREES
R AXIS - MUSE: 4 DEGREES
RBC # BLD AUTO: 4.91 10E6/UL (ref 3.8–5.2)
SODIUM SERPL-SCNC: 142 MMOL/L (ref 135–145)
SYSTOLIC BLOOD PRESSURE - MUSE: NORMAL MMHG
SYSTOLIC BLOOD PRESSURE - MUSE: NORMAL MMHG
T AXIS - MUSE: -38 DEGREES
T AXIS - MUSE: 11 DEGREES
TROPONIN T SERPL HS-MCNC: <6 NG/L
TSH SERPL DL<=0.005 MIU/L-ACNC: 1.9 UIU/ML (ref 0.3–4.2)
VENTRICULAR RATE- MUSE: 136 BPM
VENTRICULAR RATE- MUSE: 68 BPM
WBC # BLD AUTO: 7.4 10E3/UL (ref 4–11)

## 2024-09-25 PROCEDURE — 80048 BASIC METABOLIC PNL TOTAL CA: CPT | Performed by: EMERGENCY MEDICINE

## 2024-09-25 PROCEDURE — 93005 ELECTROCARDIOGRAM TRACING: CPT | Mod: 76

## 2024-09-25 PROCEDURE — 258N000003 HC RX IP 258 OP 636: Performed by: EMERGENCY MEDICINE

## 2024-09-25 PROCEDURE — 93005 ELECTROCARDIOGRAM TRACING: CPT

## 2024-09-25 PROCEDURE — 99285 EMERGENCY DEPT VISIT HI MDM: CPT | Mod: 25

## 2024-09-25 PROCEDURE — 250N000011 HC RX IP 250 OP 636: Performed by: EMERGENCY MEDICINE

## 2024-09-25 PROCEDURE — 36415 COLL VENOUS BLD VENIPUNCTURE: CPT | Performed by: EMERGENCY MEDICINE

## 2024-09-25 PROCEDURE — 83735 ASSAY OF MAGNESIUM: CPT | Performed by: EMERGENCY MEDICINE

## 2024-09-25 PROCEDURE — 96374 THER/PROPH/DIAG INJ IV PUSH: CPT

## 2024-09-25 PROCEDURE — 84443 ASSAY THYROID STIM HORMONE: CPT | Performed by: EMERGENCY MEDICINE

## 2024-09-25 PROCEDURE — 96361 HYDRATE IV INFUSION ADD-ON: CPT

## 2024-09-25 PROCEDURE — 85004 AUTOMATED DIFF WBC COUNT: CPT | Performed by: EMERGENCY MEDICINE

## 2024-09-25 PROCEDURE — 84484 ASSAY OF TROPONIN QUANT: CPT | Performed by: EMERGENCY MEDICINE

## 2024-09-25 RX ORDER — DILTIAZEM HYDROCHLORIDE 5 MG/ML
20 INJECTION INTRAVENOUS ONCE
Status: COMPLETED | OUTPATIENT
Start: 2024-09-25 | End: 2024-09-25

## 2024-09-25 RX ORDER — ASPIRIN 81 MG/1
324 TABLET, CHEWABLE ORAL ONCE
Status: DISCONTINUED | OUTPATIENT
Start: 2024-09-25 | End: 2024-09-25

## 2024-09-25 RX ADMIN — DILTIAZEM HYDROCHLORIDE 20 MG: 5 INJECTION, SOLUTION INTRAVENOUS at 08:02

## 2024-09-25 RX ADMIN — SODIUM CHLORIDE 1000 ML: 9 INJECTION, SOLUTION INTRAVENOUS at 07:58

## 2024-09-25 ASSESSMENT — ACTIVITIES OF DAILY LIVING (ADL)
ADLS_ACUITY_SCORE: 35
ADLS_ACUITY_SCORE: 35
ADLS_ACUITY_SCORE: 33

## 2024-09-25 NOTE — ED TRIAGE NOTES
Pt here with c/o heart racing that started about an hour ago. No hx of this but states her family has hx of a-fib. HR irregular in triage, will obtain EKG. Pt denies any pain, lightheadedness or dizziness. Hx of hypertension and takes meds for this.      Triage Assessment (Adult)       Row Name 09/25/24 0631          Triage Assessment    Airway WDL WDL        Respiratory WDL    Respiratory WDL WDL        Skin Circulation/Temperature WDL    Skin Circulation/Temperature WDL WDL        Cardiac WDL    Cardiac WDL X;rhythm     Pulse Rate & Regularity radial pulse irregular        Peripheral/Neurovascular WDL    Peripheral Neurovascular WDL WDL        Cognitive/Neuro/Behavioral WDL    Cognitive/Neuro/Behavioral WDL WDL

## 2024-09-25 NOTE — ED PROVIDER NOTES
"  Emergency Department Note      History of Present Illness     Chief Complaint   Palpitations    HPI   Kimberly Miller is a 59 year old female with a history as noted below who presents to the emergency department for palpitations. The patient states that this morning at 0600, she began experiencing a sudden onset of palpitations. She denies experiencing palpitations yesterday but notes that she \"felt tired\" during the day. She adds that she is also currently slightly shortness of breath. She reports that in the past, she has been told that she has an irregular rhythm. Denies chest pain or abdominal pain. Denies weakness or dizziness. Denies recent cough, fever, or chills. Denies nausea, vomiting, diarrhea. Denies new leg swelling and notes that she has a hx of lymphedema in her left leg. Denies leg pain. Denies recent ETOH use. She adds that she has a hx of hypertension. Patient drank some water this morning.    Independent Historian   None    Review of External Notes   Reviewed primary care office visit from 2024.    Past Medical History     Medical History and Problem List   Hypertension  Hyperlipidemia  Ductal carcinoma, right breast  PFO  Colon polyps    Medications   aspirin (ASA) 81 MG chewable tablet  LISINOPRIL PO    Surgical History   Cholecystectomy  Hysterectomy  Oophorectomy    Bilateral mastectomy  Breast augmentation  Total hysterectomy    Physical Exam     Patient Vitals for the past 24 hrs:   BP Temp Temp src Pulse Resp SpO2 Weight   24 0900 -- -- -- 74 10 97 % --   24 0845 135/72 -- -- 73 16 96 % --   24 0800 (!) 132/114 -- -- (!) 147 14 99 % --   24 0700 -- -- -- (!) 144 20 99 % --   24 0630 (!) 136/112 97.5  F (36.4  C) Temporal 120 18 100 % 104.3 kg (230 lb)     Physical Exam  Vitals and nursing note reviewed.   Constitutional:       General: She is not in acute distress.     Appearance: She is not ill-appearing.   HENT:      Head: Normocephalic " and atraumatic.      Right Ear: External ear normal.      Left Ear: External ear normal.      Nose: Nose normal.      Mouth/Throat:      Mouth: Mucous membranes are moist.   Eyes:      Extraocular Movements: Extraocular movements intact.      Conjunctiva/sclera: Conjunctivae normal.   Cardiovascular:      Rate and Rhythm: Tachycardia present. Rhythm irregular.      Heart sounds: No murmur heard.  Pulmonary:      Effort: Pulmonary effort is normal. No respiratory distress.      Breath sounds: Normal breath sounds. No wheezing, rhonchi or rales.   Abdominal:      General: Abdomen is flat. Bowel sounds are normal. There is no distension.      Palpations: Abdomen is soft.      Tenderness: There is no abdominal tenderness. There is no guarding or rebound.   Musculoskeletal:         General: No deformity or signs of injury.      Cervical back: Normal range of motion and neck supple.   Skin:     General: Skin is warm and dry.      Findings: No rash.   Neurological:      Mental Status: She is alert and oriented to person, place, and time.   Psychiatric:         Behavior: Behavior normal.     Who presents with palpitations that started this morning.  Patient appears to be in atrial fibrillation with  Right    Diagnostics     Lab Results   Labs Ordered and Resulted from Time of ED Arrival to Time of ED Departure   BASIC METABOLIC PANEL - Abnormal       Result Value    Sodium 142      Potassium 3.9      Chloride 106      Carbon Dioxide (CO2) 24      Anion Gap 12      Urea Nitrogen 18.2      Creatinine 0.88      GFR Estimate 75      Calcium 9.4      Glucose 105 (*)    TROPONIN T, HIGH SENSITIVITY - Normal    Troponin T, High Sensitivity <6     MAGNESIUM - Normal    Magnesium 2.1     TSH WITH FREE T4 REFLEX - Normal    TSH 1.90     CBC WITH PLATELETS AND DIFFERENTIAL    WBC Count 7.4      RBC Count 4.91      Hemoglobin 15.1      Hematocrit 43.8      MCV 89      MCH 30.8      MCHC 34.5      RDW 12.3      Platelet Count 338      %  Neutrophils 47      % Lymphocytes 43      % Monocytes 8      % Eosinophils 2      % Basophils 1      % Immature Granulocytes 0      NRBCs per 100 WBC 0      Absolute Neutrophils 3.5      Absolute Lymphocytes 3.2      Absolute Monocytes 0.6      Absolute Eosinophils 0.1      Absolute Basophils 0.1      Absolute Immature Granulocytes 0.0      Absolute NRBCs 0.0       Imaging   None    EKG   ECG results from 09/25/24   EKG 12 lead     Value    Systolic Blood Pressure     Diastolic Blood Pressure     Ventricular Rate 136    Atrial Rate     CA Interval     QRS Duration 88        QTc 481    P Axis     R AXIS 37    T Axis -38    Interpretation ECG      Atrial fibrillation with rapid ventricular response  ST depression, consider subendocardial injury  Abnormal QRS-T angle, consider primary T wave abnormality  Abnormal ECG  No previous ECGs available  Unconfirmed report - interpretation of this ECG is computer generated - see medical record for final interpretation  Confirmed by - EMERGENCY ROOM, PHYSICIAN ([id number]),  Rony Dee ([id number]) on 9/25/2024 7:41:09 AM     EKG 12-lead, tracing only     Value    Systolic Blood Pressure     Diastolic Blood Pressure     Ventricular Rate 68    Atrial Rate 68    CA Interval 176    QRS Duration 78        QTc 410    P Axis 20    R AXIS 4    T Axis 11    Interpretation ECG      Sinus rhythm  Normal ECG  When compared with ECG of 25-Sep-2024 06:31,  Sinus rhythm has replaced Atrial fibrillation  Vent. rate has decreased by  68 bpm  ST no longer depressed in Inferior leads  ST no longer depressed in Anterolateral leads  T wave inversion less evident in Inferior leads  T wave amplitude has increased in Lateral leads  Unconfirmed report - interpretation of this ECG is computer generated - see medical record for final interpretation  Confirmed by - EMERGENCY ROOM, PHYSICIAN ([id number]),  Rony Dee ([id number]) on 9/25/2024 9:13:35 AM       Independent Interpretation   None    ED  Course      Medications Administered   Medications   sodium chloride 0.9% BOLUS 1,000 mL (0 mLs Intravenous Stopped 9/25/24 0901)   diltiazem (CARDIZEM) injection 20 mg (20 mg Intravenous $Given 9/25/24 0802)     Discussion of Management   None    ED Course   ED Course as of 09/25/24 0918   Wed Sep 25, 2024   0717 I obtained history and examined the patient as noted above.      0832 I rechecked the patient. Patient self-converted.       Additional Documentation  None    Medical Decision Making / Diagnosis     CMS Diagnoses: None    MIPS   None    MDM   Kimberly Miller is a 59 year old female who presents with palpitations that started this morning.  She appears to be in A-fib with rapid ventricular rate on arrival to the ED.  Will check labs including electrolytes as noted above and TSH which are all normal.  She was given a dose of 20 mg of IV diltiazem and she converted to normal sinus rhythm.  Repeat EKG confirms normal sinus rhythm and no other acute findings.  Patient was observed for short period of time and she had no recurrence of A-fib.  Her [id number] 2 score is a 2 so she may need anticoagulation but I will defer this to cardiology follow-up.  We discussed home care and return precautions.  Cardiology referral was placed.    Disposition   The patient was discharged.     Diagnosis     ICD-10-CM    1. Paroxysmal atrial fibrillation (H)  I48.0 Follow-Up with Cardiology         Discharge Medications   Discharge Medication List as of 9/25/2024  9:01 AM        Scribe Disclosure:  Clay PHAN, am serving as a scribe at 7:00 AM on 9/25/2024 to document services personally performed by Luke Marr MD based on my observations and the provider's statements to me.        Luke Marr MD  09/25/24 0922

## 2024-10-01 ENCOUNTER — TELEPHONE (OUTPATIENT)
Dept: CARDIOLOGY | Facility: CLINIC | Age: 59
End: 2024-10-01

## 2024-10-01 ENCOUNTER — OFFICE VISIT (OUTPATIENT)
Dept: CARDIOLOGY | Facility: CLINIC | Age: 59
End: 2024-10-01
Attending: EMERGENCY MEDICINE
Payer: COMMERCIAL

## 2024-10-01 VITALS
SYSTOLIC BLOOD PRESSURE: 128 MMHG | HEIGHT: 66 IN | OXYGEN SATURATION: 98 % | BODY MASS INDEX: 37.4 KG/M2 | HEART RATE: 68 BPM | WEIGHT: 232.7 LBS | DIASTOLIC BLOOD PRESSURE: 81 MMHG

## 2024-10-01 DIAGNOSIS — I10 ESSENTIAL HYPERTENSION: ICD-10-CM

## 2024-10-01 DIAGNOSIS — I48.0 PAROXYSMAL ATRIAL FIBRILLATION (H): ICD-10-CM

## 2024-10-01 DIAGNOSIS — I10 ESSENTIAL HYPERTENSION: Primary | ICD-10-CM

## 2024-10-01 DIAGNOSIS — I48.0 PAROXYSMAL ATRIAL FIBRILLATION (H): Primary | ICD-10-CM

## 2024-10-01 PROCEDURE — 99204 OFFICE O/P NEW MOD 45 MIN: CPT | Performed by: INTERNAL MEDICINE

## 2024-10-01 NOTE — PROGRESS NOTES
HISTORY OF PRESENT ILLNESS:  Kimberly Miller a 59 year old female with hypertension was evaluated in consultation at the request of her primary care physician for newly diagnosed paroxysmal atrial fibrillation.    The patient awoke at about 6:00 in the morning on 2024 with sustained palpitations and mild dyspnea.  There were no obvious precipitating factors.  She presented to the [facility] emergency room her ECG confirmed atrial fibrillation with a rapid ventricular response rate.  BMP, high sensitivity troponin, magnesium, thyroid function studies, CBC, are all normal.  He was given a single dose of intravenous diltiazem 20 mg and later converted back to a sinus rhythm.  Cardiology follow-up was arranged.    There have been no recurrent palpitations since that episode.  The patient has 2 siblings with atrial fibrillation, 1 of whom is undergone ablation and the other was on anticoagulation with warfarin for stroke associated with atrial fibrillation.   Her mother  suddenly of unknown cause although reportedly had prolonged QT syndrome and was on medications that could prolong the QT interval. Ms. Miller is  under 64 years of age, denies previous stroke, congestive heart failure, diabetes mellitus, or known vascular disease.  She has well-controlled hypertension.  She underwent a normal transthoracic echo in the [facility] system in  during an evaluation for nonsustained palpitations.        PAST MEDICAL HISTORY  Essential hypertension  Dyslipidemia  Right breast ductal carcinoma sp bilateral mastectomy  SUMAYA endopmetriosis  Sp breast augmentation  Paroxysmal atrial fibrillation : CHADSVASc 2 ( HTN/gender)    Orders this Visit:  No orders of the defined types were placed in this encounter.    No orders of the defined types were placed in this encounter.    There are no discontinued medications.    Encounter Diagnosis   Name Primary?    Paroxysmal atrial fibrillation (H)        CURRENT  "MEDICATIONS:  Current Outpatient Medications   Medication Sig Dispense Refill    aspirin (ASA) 81 MG chewable tablet Take 81 mg by mouth daily      fish oil-omega-3 fatty acids 1000 MG capsule Take 2 g by mouth daily      LISINOPRIL PO Take 10 mg by mouth daily      VITAMIN D, CHOLECALCIFEROL, PO Take 2,000 Units by mouth daily         ALLERGIES     Allergies   Allergen Reactions    Penicillin [Penicillins] Rash       PAST MEDICAL, SURGICAL, FAMILY, SOCIAL HISTORY:  History was reviewed and updated as needed, see medical record.     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    Physical Exam:  Vitals: /81 (BP Location: Right arm, Patient Position: Sitting, Cuff Size: Adult Regular)   Pulse 68   Ht 1.676 m (5' 6\")   Wt 105.6 kg (232 lb 11.2 oz)   LMP 04/14/2008   SpO2 98%   BMI 37.56 kg/m      Constitutional: No apparent distress very intelligent overweight    HEENT: pupils equal round reactive to light, thyroid normal size, JVP is normal    Chest: Clear to percussion and auscultation    Cardiac: Normal S1, normal S2 no S3, no murmur or click    Abdomen: Moderate obesity Liver percusses to 6 cm spleen is not palpable aorta is not tender or enlarged    Extremitiies: no edema.    Neurological:  Cranial nerves II through XII are intact, strength equal and symmetrical, displays normal insight and judgment        ASSESSMENT: The patient has newly diagnosed paroxysmal atrial fibrillation.  Current ACC guidelines favor anticoagulation for the prevention of stroke.  After prolonged discussion of the potential options, the patient would prefer to take apixaban, which I feel is an excellent choice.  We discussed suppressive treatment for atrial fibrillation including the use of beta-blocker therapy, antiarrhythmics, ablation, and the use of left atrial " "occlusion devices.  The patient would prefer to avoid any additional therapy at this time and wishes to remain on apixaban alone.  If she has more episodes, we may need to repeat address the issue of suppressive therapy.    Finally, lifestyle intervention may be help to reduce the incidence of recurrent atrial fibrillation.  I have advised a Mediterranean-style weight loss diet and a regular exercise program.  The patient's been previously screened for sleep apnea and does not have sleep apnea.    RECOMMENDATIONS:   1) apixaban 5 mg bid  2) Mediterranean style weight loss diet  3) Regular exercise program  4) Follow up in one year with ECG  5) TTE to exclude structural heart disease        Recent Lab Results:    I have personally reviewed the patient's electrocardiogram from the emergency room and agree she was in atrial fibrillation.  Her repeat ECG after conversion shows a sinus rhythm and is within normal limit.  I have reviewed the emergency room labs which were all within normal limits except for the ECG.  LIPID RESULTS:  No results found for: \"CHOL\", \"HDL\", \"LDL\", \"TRIG\", \"CHOLHDLRATIO\"    LIVER ENZYME RESULTS:  Lab Results   Component Value Date    AST 14 09/18/2020    ALT 28 09/18/2020       CBC RESULTS:  Lab Results   Component Value Date    WBC 7.4 09/25/2024    WBC 9.7 09/18/2020    RBC 4.91 09/25/2024    RBC 4.61 09/18/2020    HGB 15.1 09/25/2024    HGB 14.3 09/18/2020    HCT 43.8 09/25/2024    HCT 43.2 09/18/2020    MCV 89 09/25/2024    MCV 94 09/18/2020    MCH 30.8 09/25/2024    MCH 31.0 09/18/2020    MCHC 34.5 09/25/2024    MCHC 33.1 09/18/2020    RDW 12.3 09/25/2024    RDW 11.8 09/18/2020     09/25/2024     09/18/2020       BMP RESULTS:  Lab Results   Component Value Date     09/25/2024     09/18/2020    POTASSIUM 3.9 09/25/2024    POTASSIUM 4.0 09/18/2020    CHLORIDE 106 09/25/2024    CHLORIDE 106 09/18/2020    CO2 24 09/25/2024    CO2 29 09/18/2020    ANIONGAP 12 " "09/25/2024    ANIONGAP 5 09/18/2020     (H) 09/25/2024     (H) 09/18/2020    BUN 18.2 09/25/2024    BUN 20 09/18/2020    CR 0.88 09/25/2024    CR 0.85 09/18/2020    GFRESTIMATED 75 09/25/2024    GFRESTIMATED 77 09/18/2020    GFRESTBLACK 89 09/18/2020    MILY 9.4 09/25/2024    MILY 9.0 09/18/2020        A1C RESULTS:  No results found for: \"A1C\"    INR RESULTS:  No results found for: \"INR\"    We greatly appreciate the opportunity to be involved in the care of your patient, Kimberly Miller.    Sincerely,  Pedro Grant MD      CC  Luke Marr MD  EMERGENCY PHYSICIAN PA  [address]  [place] [address]                                                                     "

## 2024-10-01 NOTE — TELEPHONE ENCOUNTER
Health Call Center    Phone Message    May a detailed message be left on voicemail: yes     Reason for Call: Other: Dr. Grant started Kimberly on blood thinners today and she would like a call back to discuss whether or not she should still be taking her baby aspirin as well.     Action Taken: Other: Cardiology    Travel Screening: Not Applicable     Thank you!  [facility]

## 2024-10-01 NOTE — TELEPHONE ENCOUNTER
Pt to stop ASA and start Apixaban 5 mg BID per . Sent in script and discussed w/ pt.     Bro[place]lyn MONROE  [facility]

## 2024-10-01 NOTE — LETTER
"10/1/2024    Chase Coyne MD  [address] [place] [address]    RE: Kimberly Miller       Dear Colleague,     I had the pleasure of seeing Kimberly Miller in the [facility].  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 Long QT sister and brother with Atrial fibrillation    0550 noted rapid heart rate went in.  Nothing since no diabetes  no stroke no heart failure       PAST MEDICAL HISTORY  Essential hypertension  Dyslipidemia  Right breast ductal carcinoma sp bilateral mastectomy  SUMAYA endopmetriosis  Sp breast augmentation    Orders this Visit:  No orders of the defined types were placed in this encounter.    No orders of the defined types were placed in this encounter.    There are no discontinued medications.    Encounter Diagnosis   Name Primary?     Paroxysmal atrial fibrillation (H)        CURRENT MEDICATIONS:  Current Outpatient Medications   Medication Sig Dispense Refill     aspirin (ASA) 81 MG chewable tablet Take 81 mg by mouth daily       fish oil-omega-3 fatty acids 1000 MG capsule Take 2 g by mouth daily       LISINOPRIL PO Take 10 mg by mouth daily       VITAMIN D, CHOLECALCIFEROL, PO Take 2,000 Units by mouth daily         ALLERGIES     Allergies   Allergen Reactions     Penicillin [Penicillins] Rash       PAST MEDICAL, SURGICAL, FAMILY, SOCIAL HISTORY:  History was reviewed and updated as needed, see medical record.        Review of Systems:  A 12-point review of systems was completed, see medical record for detailed review of systems information.    Physical Exam:  Vitals: /81 (BP Location: Right arm, Patient Position: Sitting, Cuff Size: Adult Regular)   Pulse 68   Ht 1.676 m (5' 6\")   Wt 105.6 kg (232 lb 11.2 oz)   LMP 04/14/2008   SpO2 98%   BMI " "37.56 kg/m      Constitutional: No apparent distress    HEENT: pupils equal round reactive to light, thyroid normal size, JVP is normal    Chest: Clear to percussion and auscultation    Cardiac: Normal S1, normal S2 no S3, no murmur or click    Abdomen: Scaphoid.  Liver percusses to 6 cm spleen is not palpable aorta is not tender or enlarged    Extremitiies: no edema.    Neurological:  Cranial nerves II through XII are intact, strength equal and symmetrical, displays normal insight and judgment        ASSESSMENT: Kimberly Miller is a 59 year old female with          We reviewed the benefits of a regular exercise program, a Mediterranean-style weight loss diet, and contacting us for any changes in between now and the time of her next visit.     RECOMMENDATIONS:   1) apixaban 5 mg bid  2) Mediterranean style weight loss diet  3) Regular exercise program  4) Follow up in one year with ECG  5) TTE to exclude structural heart disease        Recent Lab Results:  LIPID RESULTS:  No results found for: \"CHOL\", \"HDL\", \"LDL\", \"TRIG\", \"CHOLHDLRATIO\"    LIVER ENZYME RESULTS:  Lab Results   Component Value Date    AST 14 09/18/2020    ALT 28 09/18/2020       CBC RESULTS:  Lab Results   Component Value Date    WBC 7.4 09/25/2024    WBC 9.7 09/18/2020    RBC 4.91 09/25/2024    RBC 4.61 09/18/2020    HGB 15.1 09/25/2024    HGB 14.3 09/18/2020    HCT 43.8 09/25/2024    HCT 43.2 09/18/2020    MCV 89 09/25/2024    MCV 94 09/18/2020    MCH 30.8 09/25/2024    MCH 31.0 09/18/2020    MCHC 34.5 09/25/2024    MCHC 33.1 09/18/2020    RDW 12.3 09/25/2024    RDW 11.8 09/18/2020     09/25/2024     09/18/2020       BMP RESULTS:  Lab Results   Component Value Date     09/25/2024     09/18/2020    POTASSIUM 3.9 09/25/2024    POTASSIUM 4.0 09/18/2020    CHLORIDE 106 09/25/2024    CHLORIDE 106 09/18/2020    CO2 24 09/25/2024    CO2 29 09/18/2020    ANIONGAP 12 09/25/2024    ANIONGAP 5 09/18/2020    [facility] 105 (H) " "09/25/2024     (H) 09/18/2020    BUN 18.2 09/25/2024    BUN 20 09/18/2020    CR 0.88 09/25/2024    CR 0.85 09/18/2020    GFRESTIMATED 75 09/25/2024    GFRESTIMATED 77 09/18/2020    GFRESTBLACK 89 09/18/2020    MILY 9.4 09/25/2024    MILY 9.0 09/18/2020        A1C RESULTS:  No results found for: \"A1C\"    INR RESULTS:  No results found for: \"INR\"    We greatly appreciate the opportunity to be involved in the care of your patient, Kimberly Miller.    Sincerely,  Pedro Grant MD      CC  Luke Marr MD  EMERGENCY PHYSICIAN PA  [address]  [place] [address]                                                                       Thank you for allowing me to participate in the care of your patient.      Sincerely,     Pedro Grant MD     [facility]  cc:   Luke Marr MD  EMERGENCY PHYSICIAN PA  [address]  [place] [address]      "

## 2024-10-30 ENCOUNTER — HOSPITAL ENCOUNTER (OUTPATIENT)
Dept: CARDIOLOGY | Facility: CLINIC | Age: 59
Discharge: HOME | End: 2024-10-30
Attending: INTERNAL MEDICINE | Admitting: INTERNAL MEDICINE
Payer: COMMERCIAL

## 2024-10-30 DIAGNOSIS — I48.0 PAROXYSMAL ATRIAL FIBRILLATION (H): ICD-10-CM

## 2024-10-30 DIAGNOSIS — I10 ESSENTIAL HYPERTENSION: ICD-10-CM

## 2024-10-30 LAB — LVEF ECHO: NORMAL

## 2024-10-30 PROCEDURE — 93306 TTE W/DOPPLER COMPLETE: CPT | Mod: 26 | Performed by: INTERNAL MEDICINE

## 2024-10-30 PROCEDURE — 93306 TTE W/DOPPLER COMPLETE: CPT

## 2024-11-11 ENCOUNTER — MYC MEDICAL ADVICE (OUTPATIENT)
Dept: CARDIOLOGY | Facility: CLINIC | Age: 59
End: 2024-11-11
Payer: COMMERCIAL

## 2025-04-12 ENCOUNTER — HEALTH MAINTENANCE LETTER (OUTPATIENT)
Age: 60
End: 2025-04-12

## 2025-07-05 ENCOUNTER — HEALTH MAINTENANCE LETTER (OUTPATIENT)
Age: 60
End: 2025-07-05

## 2025-09-02 DIAGNOSIS — I48.0 PAROXYSMAL ATRIAL FIBRILLATION (H): ICD-10-CM

## 2025-09-02 DIAGNOSIS — I10 ESSENTIAL HYPERTENSION: ICD-10-CM

## (undated) DEVICE — ENDO TRAP POLYP E-TRAP 00711099

## (undated) DEVICE — ENDO SNARE POLYPECTOMY SPIRAL 20MM LOOP SD-230U-20

## (undated) DEVICE — ENDO FORCEP ENDOJAW BIOPSY 2.8MMX230CM FB-220U

## (undated) DEVICE — KIT ENDO TURNOVER/PROCEDURE W/CLEAN A SCOPE LINERS 103888

## (undated) RX ORDER — FENTANYL CITRATE 0.05 MG/ML
INJECTION, SOLUTION INTRAMUSCULAR; INTRAVENOUS
Status: DISPENSED
Start: 2022-12-20

## (undated) RX ORDER — SIMETHICONE 40MG/0.6ML
SUSPENSION, DROPS(FINAL DOSAGE FORM)(ML) ORAL
Status: DISPENSED
Start: 2022-12-20